# Patient Record
Sex: FEMALE | Race: WHITE | URBAN - METROPOLITAN AREA
[De-identification: names, ages, dates, MRNs, and addresses within clinical notes are randomized per-mention and may not be internally consistent; named-entity substitution may affect disease eponyms.]

---

## 2017-03-21 ENCOUNTER — TELEPHONE (OUTPATIENT)
Dept: FAMILY MEDICINE CLINIC | Facility: CLINIC | Age: 82
End: 2017-03-21

## 2017-03-21 DIAGNOSIS — R92.8 ABNORMAL MAMMOGRAM OF RIGHT BREAST: Primary | ICD-10-CM

## 2017-03-21 RX ORDER — MINOXIDIL 2 %
1 SOLUTION, NON-ORAL TOPICAL DAILY
COMMUNITY
Start: 2014-11-07

## 2017-03-21 RX ORDER — FLUOCINONIDE 0.5 MG/G
1 OINTMENT TOPICAL 2 TIMES DAILY PRN
COMMUNITY
Start: 2016-04-11 | End: 2020-05-29 | Stop reason: ALTCHOICE

## 2017-03-21 RX ORDER — MULTIVITAMIN
1 CAPSULE ORAL DAILY
COMMUNITY
Start: 2014-11-07

## 2017-03-29 ENCOUNTER — TELEPHONE (OUTPATIENT)
Dept: FAMILY MEDICINE CLINIC | Facility: CLINIC | Age: 82
End: 2017-03-29

## 2017-03-29 ENCOUNTER — OFFICE VISIT (OUTPATIENT)
Dept: FAMILY MEDICINE CLINIC | Facility: CLINIC | Age: 82
End: 2017-03-29

## 2017-03-29 VITALS
WEIGHT: 172.13 LBS | HEIGHT: 63 IN | SYSTOLIC BLOOD PRESSURE: 184 MMHG | HEART RATE: 80 BPM | RESPIRATION RATE: 16 BRPM | BODY MASS INDEX: 30.5 KG/M2 | TEMPERATURE: 98 F | DIASTOLIC BLOOD PRESSURE: 98 MMHG

## 2017-03-29 DIAGNOSIS — K57.30 DIVERTICULOSIS OF LARGE INTESTINE WITHOUT HEMORRHAGE: ICD-10-CM

## 2017-03-29 DIAGNOSIS — M35.3 POLYMYALGIA RHEUMATICA (HCC): ICD-10-CM

## 2017-03-29 DIAGNOSIS — K57.32 DIVERTICULITIS OF COLON: Primary | ICD-10-CM

## 2017-03-29 DIAGNOSIS — E78.5 HYPERLIPIDEMIA, UNSPECIFIED HYPERLIPIDEMIA TYPE: ICD-10-CM

## 2017-03-29 PROBLEM — Z98.890 H/O COLONOSCOPY: Status: ACTIVE | Noted: 2017-03-29

## 2017-03-29 PROCEDURE — 99213 OFFICE O/P EST LOW 20 MIN: CPT | Performed by: FAMILY MEDICINE

## 2017-03-29 RX ORDER — AMOXICILLIN AND CLAVULANATE POTASSIUM 875; 125 MG/1; MG/1
1 TABLET, FILM COATED ORAL 2 TIMES DAILY
Qty: 20 TABLET | Refills: 0 | Status: SHIPPED | OUTPATIENT
Start: 2017-03-29 | End: 2017-04-08

## 2017-03-29 RX ORDER — MELATONIN
1000 DAILY
COMMUNITY
End: 2018-10-02

## 2017-03-29 RX ORDER — MULTIVIT WITH MINERALS/LUTEIN
1000 TABLET ORAL DAILY
COMMUNITY
End: 2018-05-25

## 2017-03-29 NOTE — PROGRESS NOTES
[facility] [place]  PROGRESS NOTE  Chief Complaint:   Patient presents with:  Abdominal Pain: LLQ      HPI:   This is a 80year old female coming in for left lower quadrant abdominal pain.   She has had mild left lower quadrant pain for over a we Tab Take 1 tablet by mouth daily. Disp:  Rfl:    Omega-3 Fatty Acids ([employer] FISH OIL) 1200 MG Oral Cap Take 1 capsule by mouth daily. Disp:  Rfl:    hydrocortisone (ANUSOL-HC) 2.5 % Rectal Cream Apply 1 Application topically 2 (two) times daily as needed.  Lyla Gibbs today.  She does not look to be in any acute distress.   HEENT:  Head:  Normocephalic, atraumatic Eyes: EOMI, PERRLA, no scleral icterus, conjunctivae clear bilaterally, no eye discharge Ears: External normal. Nose: patent, no nasal discharge Throat:  No to Patient is notified to call with any questions, complications, allergies, or worsening or changing symptoms. Patient is to call with any side effects or complications from the treatments as a result of today.      Problem List:  Patient Active Problem List

## 2017-03-29 NOTE — PATIENT INSTRUCTIONS
Take Augmentin twice a day for 10 days. If not better in 10 days, come back to the office for additional evaluation.

## 2017-03-29 NOTE — TELEPHONE ENCOUNTER
Patient states She has had LLQ pain x1 week. Feels it maybe from Diverticulitis. Appt given Today 4:30 with Dr Kyle Gonzalez for evaluation of Sx.   Gretel Jalen, 03/29/2017, 11:10 AM

## 2017-04-04 ENCOUNTER — TELEPHONE (OUTPATIENT)
Dept: FAMILY MEDICINE CLINIC | Facility: CLINIC | Age: 82
End: 2017-04-04

## 2017-04-04 ENCOUNTER — MED REC SCAN ONLY (OUTPATIENT)
Dept: FAMILY MEDICINE CLINIC | Facility: CLINIC | Age: 82
End: 2017-04-04

## 2017-04-04 NOTE — TELEPHONE ENCOUNTER
Patient states She was notified by [facility] to repeat Mammogram in 6 Months.   Tonie Cueva, 04/04/2017, 2:37 PM

## 2017-05-19 ENCOUNTER — LAB ENCOUNTER (OUTPATIENT)
Dept: LAB | Age: 82
End: 2017-05-19
Attending: FAMILY MEDICINE
Payer: MEDICARE

## 2017-05-19 DIAGNOSIS — K57.32 DIVERTICULITIS OF COLON: ICD-10-CM

## 2017-05-19 DIAGNOSIS — E78.5 HYPERLIPIDEMIA, UNSPECIFIED HYPERLIPIDEMIA TYPE: ICD-10-CM

## 2017-05-19 DIAGNOSIS — M35.3 POLYMYALGIA RHEUMATICA (HCC): ICD-10-CM

## 2017-05-19 DIAGNOSIS — K57.30 DIVERTICULOSIS OF LARGE INTESTINE WITHOUT HEMORRHAGE: ICD-10-CM

## 2017-05-19 PROCEDURE — 80061 LIPID PANEL: CPT

## 2017-05-19 PROCEDURE — 86140 C-REACTIVE PROTEIN: CPT

## 2017-05-19 PROCEDURE — 84550 ASSAY OF BLOOD/URIC ACID: CPT

## 2017-05-19 PROCEDURE — 84443 ASSAY THYROID STIM HORMONE: CPT

## 2017-05-19 PROCEDURE — 85025 COMPLETE CBC W/AUTO DIFF WBC: CPT

## 2017-05-19 PROCEDURE — 36415 COLL VENOUS BLD VENIPUNCTURE: CPT

## 2017-05-19 PROCEDURE — 80053 COMPREHEN METABOLIC PANEL: CPT

## 2017-05-23 ENCOUNTER — OFFICE VISIT (OUTPATIENT)
Dept: FAMILY MEDICINE CLINIC | Facility: CLINIC | Age: 82
End: 2017-05-23

## 2017-05-23 VITALS
RESPIRATION RATE: 20 BRPM | DIASTOLIC BLOOD PRESSURE: 88 MMHG | HEIGHT: 63.4 IN | WEIGHT: 170 LBS | BODY MASS INDEX: 29.75 KG/M2 | TEMPERATURE: 98 F | SYSTOLIC BLOOD PRESSURE: 152 MMHG | HEART RATE: 80 BPM

## 2017-05-23 DIAGNOSIS — I10 HTN (HYPERTENSION), BENIGN: ICD-10-CM

## 2017-05-23 DIAGNOSIS — R92.8 ABNORMAL MAMMOGRAM: ICD-10-CM

## 2017-05-23 DIAGNOSIS — Z00.00 ENCOUNTER FOR ANNUAL HEALTH EXAMINATION: Primary | ICD-10-CM

## 2017-05-23 DIAGNOSIS — N63.0 LUMP OR MASS IN BREAST: ICD-10-CM

## 2017-05-23 DIAGNOSIS — M81.0 AGE-RELATED OSTEOPOROSIS WITHOUT CURRENT PATHOLOGICAL FRACTURE: ICD-10-CM

## 2017-05-23 DIAGNOSIS — B07.8 OTHER VIRAL WARTS: ICD-10-CM

## 2017-05-23 DIAGNOSIS — M35.3 POLYMYALGIA RHEUMATICA (HCC): ICD-10-CM

## 2017-05-23 PROBLEM — B07.9 WARTS: Status: ACTIVE | Noted: 2017-05-23

## 2017-05-23 PROCEDURE — 99214 OFFICE O/P EST MOD 30 MIN: CPT | Performed by: FAMILY MEDICINE

## 2017-05-23 PROCEDURE — G0439 PPPS, SUBSEQ VISIT: HCPCS | Performed by: FAMILY MEDICINE

## 2017-05-23 RX ORDER — LOSARTAN POTASSIUM AND HYDROCHLOROTHIAZIDE 12.5; 5 MG/1; MG/1
1 TABLET ORAL DAILY
Qty: 30 TABLET | Refills: 5 | Status: SHIPPED | OUTPATIENT
Start: 2017-05-23 | End: 2017-06-23

## 2017-05-23 NOTE — PROGRESS NOTES
[facility] [place]  PROGRESS NOTE  Chief Complaint:   Patient presents with:  Physical      HPI:   This is a 80year old female coming in for her annual Medicare physical.  She said that she has been feeling very good overall.     She has noticed .0 150.0-450.0 10(3)uL   MCV 93.7 81.0-100.0 fL   MCH 31.8 27.0-33.2 pg   MCHC 34.0 31.0-37.0 g/dL   RDW 12.7 11.5-16.0 %   RDW-SD 43.8 35.1-46.3 fL   Neutrophil Absolute Prelim 2.58 1.30-6.70 x10 (3) uL   Neutrophil Absolute 2.58 1.30-6.70 x10(3) Disp:  Rfl:    Multiple Vitamin (MULTIVITAMINS) Oral Cap Take 1 capsule by mouth daily. Disp:  Rfl:    Fluocinonide 0.05 % External Ointment Apply 1 Application topically 2 (two) times daily as needed.  Disp:  Rfl:    hydrocortisone (ANUSOL-HC) 2.5 % Rectal Patient is alert, awake and oriented, well developed, well nourished, no apparent distress.   HEENT:  Head:  Normocephalic, atraumatic Eyes: EOMI, PERRLA, no scleral icterus, conjunctivae clear bilaterally, no eye discharge Ears: External normal. Nose: lo diagnosis of hypertension. She has escaped taking a regular daily medicine until age 80 but she now needs blood pressure medication. Plan: Losartan/hydrochlorothiazide 50/12.5 one tablet daily. Recheck in 4 weeks.     5. Polymyalgia rheumatica ([facility] [address]  She

## 2017-05-23 NOTE — PATIENT INSTRUCTIONS
Start taking Losartan daily for blood pressure. Use Compound W on warts on hand. Recommended Websites for Advanced Directives    [email or web address] org/publications/Documents/personal_[email or web address]. pdf  An information packet, including necessary form from the [facility]

## 2017-06-23 ENCOUNTER — OFFICE VISIT (OUTPATIENT)
Dept: FAMILY MEDICINE CLINIC | Facility: CLINIC | Age: 82
End: 2017-06-23

## 2017-06-23 VITALS
TEMPERATURE: 98 F | WEIGHT: 169.13 LBS | HEIGHT: 62 IN | HEART RATE: 104 BPM | RESPIRATION RATE: 16 BRPM | BODY MASS INDEX: 31.12 KG/M2 | DIASTOLIC BLOOD PRESSURE: 58 MMHG | SYSTOLIC BLOOD PRESSURE: 124 MMHG

## 2017-06-23 DIAGNOSIS — I10 HTN (HYPERTENSION), BENIGN: Primary | ICD-10-CM

## 2017-06-23 PROCEDURE — 99213 OFFICE O/P EST LOW 20 MIN: CPT | Performed by: FAMILY MEDICINE

## 2017-06-23 RX ORDER — LOSARTAN POTASSIUM AND HYDROCHLOROTHIAZIDE 12.5; 5 MG/1; MG/1
1 TABLET ORAL DAILY
Qty: 90 TABLET | Refills: 3 | Status: SHIPPED | OUTPATIENT
Start: 2017-06-23 | End: 2017-09-22

## 2017-06-23 NOTE — PROGRESS NOTES
[address]  PROGRESS NOTE  Chief Complaint:   Patient presents with: Follow - Up:  with Recent CVA      HPI:   This is a 80year old female coming in for aloe up on her blood pressure.   She is not having any problems whatsoever w 35.1-46.3 fL   Neutrophil Absolute Prelim 2.58 1.30-6.70 x10 (3) uL   Neutrophil Absolute 2.58 1.30-6.70 x10(3) uL   Lymphocyte Absolute 1.87 0.90-4.00 x10(3) uL   Monocyte Absolute 0.63 (H) 0.10-0.60 x10(3) uL   Eosinophil Absolute 0.08 0.00-0.30 x10(3) u hydrocortisone (ANUSOL-HC) 2.5 % Rectal Cream Apply 1 Application topically 2 (two) times daily as needed. Disp:  Rfl:    Multiple Vitamin (MULTIVITAMINS) Oral Cap Take 1 capsule by mouth daily.  Disp:  Rfl:    [DISCONTINUED] Losartan Potassium-HCTZ 50-12 normal. Nose: patent, no nasal discharge Throat:  No tonsillar erythema or exudate. Mouth:  No oral lesions or ulcerations, good dentition. NECK: Supple, no CLAD, no JVD, no thyromegaly. SKIN: No rashes, no skin lesion, no bruising, good turgor.   HEART:

## 2017-09-18 ENCOUNTER — TELEPHONE (OUTPATIENT)
Dept: FAMILY MEDICINE CLINIC | Facility: CLINIC | Age: 82
End: 2017-09-18

## 2017-09-18 NOTE — TELEPHONE ENCOUNTER
Confirmed with Patient She was informed by [facility] to repeat Mammogram 6 Months-Yes, She was informed.   Nathan Gan, 09/18/17, 4:29 PM

## 2017-09-22 ENCOUNTER — OFFICE VISIT (OUTPATIENT)
Dept: FAMILY MEDICINE CLINIC | Facility: CLINIC | Age: 82
End: 2017-09-22

## 2017-09-22 VITALS
HEART RATE: 88 BPM | SYSTOLIC BLOOD PRESSURE: 130 MMHG | HEIGHT: 62 IN | DIASTOLIC BLOOD PRESSURE: 56 MMHG | RESPIRATION RATE: 16 BRPM | WEIGHT: 168 LBS | TEMPERATURE: 98 F | BODY MASS INDEX: 30.91 KG/M2

## 2017-09-22 DIAGNOSIS — I10 HTN (HYPERTENSION), BENIGN: Primary | ICD-10-CM

## 2017-09-22 PROCEDURE — 99213 OFFICE O/P EST LOW 20 MIN: CPT | Performed by: FAMILY MEDICINE

## 2017-09-22 RX ORDER — LOSARTAN POTASSIUM AND HYDROCHLOROTHIAZIDE 12.5; 5 MG/1; MG/1
1 TABLET ORAL DAILY
Qty: 90 TABLET | Refills: 3 | Status: SHIPPED | OUTPATIENT
Start: 2017-09-22 | End: 2018-09-07

## 2017-09-22 NOTE — PROGRESS NOTES
[address]  PROGRESS NOTE  Chief Complaint:   Patient presents with: Follow - Up      HPI:   This is a 80year old female coming in for follow-up on her blood pressure.   Since she was here last her  has passed away from his hemor Lymphocyte Absolute 1.87 0.90 - 4.00 x10(3) uL   Monocyte Absolute 0.63 (H) 0.10 - 0.60 x10(3) uL   Eosinophil Absolute 0.08 0.00 - 0.30 x10(3) uL   Basophil Absolute 0.05 0.00 - 0.10 x10(3) uL   Immature Granulocyte Absolute 0.02 0.00 - 1.00 x10(3) uL Apply 1 Application topically 2 (two) times daily as needed. Disp:  Rfl:    Multiple Vitamin (MULTIVITAMINS) Oral Cap Take 1 capsule by mouth daily.  Disp:  Rfl:       Counseling given: Not Answered       REVIEW OF SYSTEMS:   CONSTITUTIONAL:  Denies unusual awake and oriented, well developed, well nourished, no apparent distress.   HEENT:  Head:  Normocephalic, atraumatic Eyes: EOMI, PERRLA, no scleral icterus, conjunctivae clear bilaterally, no eye discharge Ears: External normal. Nose: patent, no nasal disch

## 2018-02-21 ENCOUNTER — MED REC SCAN ONLY (OUTPATIENT)
Dept: FAMILY MEDICINE CLINIC | Facility: CLINIC | Age: 83
End: 2018-02-21

## 2018-03-28 ENCOUNTER — MED REC SCAN ONLY (OUTPATIENT)
Dept: FAMILY MEDICINE CLINIC | Facility: CLINIC | Age: 83
End: 2018-03-28

## 2018-05-17 ENCOUNTER — TELEPHONE (OUTPATIENT)
Dept: FAMILY MEDICINE CLINIC | Facility: CLINIC | Age: 83
End: 2018-05-17

## 2018-05-17 DIAGNOSIS — M35.3 POLYMYALGIA RHEUMATICA (HCC): ICD-10-CM

## 2018-05-17 DIAGNOSIS — Z13.220 ENCOUNTER FOR LIPID SCREENING FOR CARDIOVASCULAR DISEASE: Primary | ICD-10-CM

## 2018-05-17 DIAGNOSIS — Z90.49 HISTORY OF PARTIAL COLECTOMY: ICD-10-CM

## 2018-05-17 DIAGNOSIS — K57.30 DIVERTICULOSIS OF COLON: ICD-10-CM

## 2018-05-17 DIAGNOSIS — Z13.6 ENCOUNTER FOR LIPID SCREENING FOR CARDIOVASCULAR DISEASE: Primary | ICD-10-CM

## 2018-05-17 DIAGNOSIS — I10 HTN (HYPERTENSION), BENIGN: ICD-10-CM

## 2018-05-17 DIAGNOSIS — M81.0 AGE-RELATED OSTEOPOROSIS WITHOUT CURRENT PATHOLOGICAL FRACTURE: ICD-10-CM

## 2018-05-17 NOTE — TELEPHONE ENCOUNTER
Pt needs orders for fasting labs regarding upcoming appointment on Monday 5/21.  Pt scheduled to see Dr Edgar Campbell for annual px on Friday 5/25

## 2018-05-21 ENCOUNTER — LABORATORY ENCOUNTER (OUTPATIENT)
Dept: LAB | Age: 83
End: 2018-05-21
Attending: FAMILY MEDICINE
Payer: MEDICARE

## 2018-05-21 DIAGNOSIS — M81.0 AGE-RELATED OSTEOPOROSIS WITHOUT CURRENT PATHOLOGICAL FRACTURE: ICD-10-CM

## 2018-05-21 DIAGNOSIS — M35.3 POLYMYALGIA RHEUMATICA (HCC): ICD-10-CM

## 2018-05-21 DIAGNOSIS — Z13.6 ENCOUNTER FOR LIPID SCREENING FOR CARDIOVASCULAR DISEASE: ICD-10-CM

## 2018-05-21 DIAGNOSIS — Z90.49 HISTORY OF PARTIAL COLECTOMY: ICD-10-CM

## 2018-05-21 DIAGNOSIS — Z13.220 ENCOUNTER FOR LIPID SCREENING FOR CARDIOVASCULAR DISEASE: ICD-10-CM

## 2018-05-21 DIAGNOSIS — I10 HTN (HYPERTENSION), BENIGN: ICD-10-CM

## 2018-05-21 DIAGNOSIS — K57.30 DIVERTICULOSIS OF COLON: ICD-10-CM

## 2018-05-21 PROCEDURE — 84550 ASSAY OF BLOOD/URIC ACID: CPT

## 2018-05-21 PROCEDURE — 85025 COMPLETE CBC W/AUTO DIFF WBC: CPT

## 2018-05-21 PROCEDURE — 80053 COMPREHEN METABOLIC PANEL: CPT

## 2018-05-21 PROCEDURE — 84443 ASSAY THYROID STIM HORMONE: CPT

## 2018-05-21 PROCEDURE — 36415 COLL VENOUS BLD VENIPUNCTURE: CPT

## 2018-05-21 PROCEDURE — 80061 LIPID PANEL: CPT

## 2018-05-25 ENCOUNTER — OFFICE VISIT (OUTPATIENT)
Dept: FAMILY MEDICINE CLINIC | Facility: CLINIC | Age: 83
End: 2018-05-25

## 2018-05-25 VITALS
RESPIRATION RATE: 14 BRPM | HEART RATE: 68 BPM | BODY MASS INDEX: 29.63 KG/M2 | SYSTOLIC BLOOD PRESSURE: 144 MMHG | HEIGHT: 62.5 IN | DIASTOLIC BLOOD PRESSURE: 60 MMHG | WEIGHT: 165.13 LBS | TEMPERATURE: 97 F

## 2018-05-25 DIAGNOSIS — K57.30 DIVERTICULOSIS OF COLON: ICD-10-CM

## 2018-05-25 DIAGNOSIS — M81.0 AGE-RELATED OSTEOPOROSIS WITHOUT CURRENT PATHOLOGICAL FRACTURE: ICD-10-CM

## 2018-05-25 DIAGNOSIS — Z00.00 ENCOUNTER FOR ANNUAL HEALTH EXAMINATION: ICD-10-CM

## 2018-05-25 DIAGNOSIS — I10 HTN (HYPERTENSION), BENIGN: Primary | ICD-10-CM

## 2018-05-25 DIAGNOSIS — M35.3 POLYMYALGIA RHEUMATICA (HCC): ICD-10-CM

## 2018-05-25 DIAGNOSIS — B07.8 OTHER VIRAL WARTS: ICD-10-CM

## 2018-05-25 DIAGNOSIS — Z23 NEED FOR VACCINATION: ICD-10-CM

## 2018-05-25 PROCEDURE — 90732 PPSV23 VACC 2 YRS+ SUBQ/IM: CPT | Performed by: FAMILY MEDICINE

## 2018-05-25 PROCEDURE — G0439 PPPS, SUBSEQ VISIT: HCPCS | Performed by: FAMILY MEDICINE

## 2018-05-25 PROCEDURE — G0009 ADMIN PNEUMOCOCCAL VACCINE: HCPCS | Performed by: FAMILY MEDICINE

## 2018-05-25 PROCEDURE — 99214 OFFICE O/P EST MOD 30 MIN: CPT | Performed by: FAMILY MEDICINE

## 2018-05-25 NOTE — PROGRESS NOTES
[facility] [place]  PROGRESS NOTE  Chief Complaint:   Patient presents with:  Physical      HPI:   This is a 80year old female coming in for her annual Medicare wellness exam.    She said that she has been feeling very good.   She is not having 33.2 pg   MCHC 35.0 31.0 - 37.0 g/dL   RDW 12.4 11.5 - 16.0 %   RDW-SD 43.3 35.1 - 46.3 fL   Neutrophil Absolute Prelim 2.89 1.30 - 6.70 x10 (3) uL   Neutrophil Absolute 2.89 1.30 - 6.70 x10(3) uL   Lymphocyte Absolute 1.77 0.90 - 4.00 x10(3) uL   Monocyte Rfl:    Omega-3 Fatty Acids ([employer] FISH OIL) 1200 MG Oral Cap Take 1 capsule by mouth daily. Disp:  Rfl:    hydrocortisone (ANUSOL-HC) 2.5 % Rectal Cream Apply 1 Application topically 2 (two) times daily as needed.  Disp:  Rfl:    Multiple Vitamin (Goldie Metro this encounter: 62.5\". Weight as of this encounter: 165 lb 2 oz. Vital signs reviewed. Physical Exam:  GEN:  Patient is alert, awake and oriented, well developed, well nourished, no apparent distress.   HEENT:  Head:  Normocephalic, atraumatic Eyes: OFFICE/OUTPT VISIT,EST,LEVL IV    4. Other viral warts  She has a history of getting warts on her hands. She had treated them in the past with Compound W successfully. Plan: Restart the Compound W on her warts.  - OFFICE/OUTPT VISIT,EST,LEVL IV    5.  Tres

## 2018-05-25 NOTE — PATIENT INSTRUCTIONS
Recommended Websites for Advanced Directives    [email or web address] org/publications/Documents/personal_[email or web address]. pdf  An information packet, including necessary form from the [employer] website. http://[email or web address] [place]. [place]/public/books/adv

## 2018-08-24 ENCOUNTER — MED REC SCAN ONLY (OUTPATIENT)
Dept: FAMILY MEDICINE CLINIC | Facility: CLINIC | Age: 83
End: 2018-08-24

## 2018-09-07 NOTE — TELEPHONE ENCOUNTER
Future appt:    Last Appointment:  5/25/2018    Cholesterol, Total (mg/dL)   Date Value   05/21/2018 189   ----------  HDL Cholesterol (mg/dL)   Date Value   05/21/2018 69   ----------  LDL Cholesterol (mg/dL)   Date Value   05/21/2018 95   ----------  Tri

## 2018-09-08 RX ORDER — LOSARTAN POTASSIUM AND HYDROCHLOROTHIAZIDE 12.5; 5 MG/1; MG/1
1 TABLET ORAL DAILY
Qty: 90 TABLET | Refills: 0 | Status: SHIPPED | OUTPATIENT
Start: 2018-09-08 | End: 2018-12-05

## 2018-10-02 ENCOUNTER — OFFICE VISIT (OUTPATIENT)
Dept: FAMILY MEDICINE CLINIC | Facility: CLINIC | Age: 83
End: 2018-10-02
Payer: MEDICARE

## 2018-10-02 VITALS
RESPIRATION RATE: 20 BRPM | TEMPERATURE: 98 F | BODY MASS INDEX: 30.29 KG/M2 | SYSTOLIC BLOOD PRESSURE: 130 MMHG | WEIGHT: 164.63 LBS | DIASTOLIC BLOOD PRESSURE: 64 MMHG | HEIGHT: 62 IN | HEART RATE: 100 BPM

## 2018-10-02 DIAGNOSIS — H25.13 AGE-RELATED NUCLEAR CATARACT OF BOTH EYES: ICD-10-CM

## 2018-10-02 DIAGNOSIS — Z01.818 PREOP EXAMINATION: Primary | ICD-10-CM

## 2018-10-02 DIAGNOSIS — I10 HTN (HYPERTENSION), BENIGN: ICD-10-CM

## 2018-10-02 PROCEDURE — 93000 ELECTROCARDIOGRAM COMPLETE: CPT | Performed by: FAMILY MEDICINE

## 2018-10-02 PROCEDURE — 99214 OFFICE O/P EST MOD 30 MIN: CPT | Performed by: FAMILY MEDICINE

## 2018-10-02 NOTE — PROGRESS NOTES
[facility] [place]  PROGRESS NOTE  Chief Complaint:   Patient presents with:  Pre-Op Exam      HPI:   This is a 80year old female coming in for preoperative clearance for cataract surgery.   She is scheduled for bilateral cataract surgery on Oct g/dL    RDW 12.4 11.5 - 16.0 %    RDW-SD 43.3 35.1 - 46.3 fL    Neutrophil Absolute Prelim 2.89 1.30 - 6.70 x10 (3) uL    Neutrophil Absolute 2.89 1.30 - 6.70 x10(3) uL    Lymphocyte Absolute 1.77 0.90 - 4.00 x10(3) uL    Monocyte Absolute 0.63 0.10 - 1.00 mouth daily. Disp:  Rfl:    hydrocortisone (ANUSOL-HC) 2.5 % Rectal Cream Apply 1 Application topically 2 (two) times daily as needed. Disp:  Rfl:    Multiple Vitamin (MULTIVITAMINS) Oral Cap Take 1 capsule by mouth daily.  Disp:  Rfl:       Counseling give stated age, well groomed. Physical Exam:  GEN:  Patient is alert, awake and oriented, well developed, well nourished, no apparent distress.   HEENT:  Head:  Normocephalic, atraumatic Eyes: EOMI, PERRLA, no scleral icterus, conjunctivae clear bilaterally, n allergies, or worsening or changing symptoms. Patient is to call with any side effects or complications from the treatments as a result of today.      Problem List:  Patient Active Problem List:     Abnormal mammogram     Lump or mass in breast     History

## 2018-10-16 ENCOUNTER — MED REC SCAN ONLY (OUTPATIENT)
Dept: FAMILY MEDICINE CLINIC | Facility: CLINIC | Age: 83
End: 2018-10-16

## 2018-11-07 ENCOUNTER — MED REC SCAN ONLY (OUTPATIENT)
Dept: FAMILY MEDICINE CLINIC | Facility: CLINIC | Age: 83
End: 2018-11-07

## 2018-12-05 RX ORDER — LOSARTAN POTASSIUM AND HYDROCHLOROTHIAZIDE 12.5; 5 MG/1; MG/1
1 TABLET ORAL DAILY
Qty: 90 TABLET | Refills: 3 | Status: SHIPPED | OUTPATIENT
Start: 2018-12-05 | End: 2019-05-28

## 2018-12-05 NOTE — TELEPHONE ENCOUNTER
Future appt:    Last Appointment:  10/2/2018  Cholesterol, Total (mg/dL)   Date Value   05/21/2018 189     HDL Cholesterol (mg/dL)   Date Value   05/21/2018 69     LDL Cholesterol (mg/dL)   Date Value   05/21/2018 95     Triglycerides (mg/dL)   Date Value

## 2018-12-21 ENCOUNTER — OFFICE VISIT (OUTPATIENT)
Dept: FAMILY MEDICINE CLINIC | Facility: CLINIC | Age: 83
End: 2018-12-21
Payer: MEDICARE

## 2018-12-21 VITALS
RESPIRATION RATE: 18 BRPM | TEMPERATURE: 97 F | BODY MASS INDEX: 30.33 KG/M2 | HEIGHT: 62 IN | SYSTOLIC BLOOD PRESSURE: 144 MMHG | WEIGHT: 164.81 LBS | HEART RATE: 94 BPM | DIASTOLIC BLOOD PRESSURE: 60 MMHG

## 2018-12-21 DIAGNOSIS — Z01.818 PREOP EXAMINATION: Primary | ICD-10-CM

## 2018-12-21 DIAGNOSIS — I10 HTN (HYPERTENSION), BENIGN: ICD-10-CM

## 2018-12-21 DIAGNOSIS — H25.13 AGE-RELATED NUCLEAR CATARACT OF BOTH EYES: ICD-10-CM

## 2018-12-21 PROCEDURE — 99214 OFFICE O/P EST MOD 30 MIN: CPT | Performed by: FAMILY MEDICINE

## 2018-12-21 PROCEDURE — 93000 ELECTROCARDIOGRAM COMPLETE: CPT | Performed by: FAMILY MEDICINE

## 2018-12-21 NOTE — PROGRESS NOTES
[facility]  [facility]ESS [facility]  Chief Complaint:   Patient presents with:  Pre-Op Exam: 1/9 eye surgery at [facility]      HPI:   This is a 80year old female coming in for preoperative evaluation for eye surgery at [facility] with Dr. Juan Jose Schluz g/dL    RDW 12.4 11.5 - 16.0 %    RDW-SD 43.3 35.1 - 46.3 fL    Neutrophil Absolute Prelim 2.89 1.30 - 6.70 x10 (3) uL    Neutrophil Absolute 2.89 1.30 - 6.70 x10(3) uL    Lymphocyte Absolute 1.77 0.90 - 4.00 x10(3) uL    Monocyte Absolute 0.63 0.10 - 1.00 Rectal Cream Apply 1 Application topically 2 (two) times daily as needed. Disp:  Rfl:    Multiple Vitamin (MULTIVITAMINS) Oral Cap Take 1 capsule by mouth daily.  Disp:  Rfl:       Counseling given: Not Answered       REVIEW OF SYSTEMS:   CONSTITUTIONAL:  D Eyes: EOMI, PERRLA, no scleral icterus, conjunctivae clear bilaterally, no eye discharge Ears: External normal. Nose: patent, no nasal discharge Throat:  No tonsillar erythema or exudate. Mouth:  No oral lesions or ulcerations, good dentition.   NECK: Supp Diverticulitis of colon     Diverticulosis of colon     Eczema     H/O oophorectomy     Osteoporosis     Polymyalgia rheumatica ([facility] [address]     History of tonsillectomy     Postmenopausal atrophic vaginitis     Warts     HTN (hypertension), benign     Age-relate

## 2018-12-26 ENCOUNTER — TELEPHONE (OUTPATIENT)
Dept: FAMILY MEDICINE CLINIC | Facility: CLINIC | Age: 83
End: 2018-12-26

## 2018-12-26 DIAGNOSIS — I10 HTN (HYPERTENSION), BENIGN: ICD-10-CM

## 2018-12-26 DIAGNOSIS — H25.13 AGE-RELATED NUCLEAR CATARACT OF BOTH EYES: ICD-10-CM

## 2018-12-26 DIAGNOSIS — Z01.818 PREOP EXAMINATION: Primary | ICD-10-CM

## 2018-12-26 NOTE — TELEPHONE ENCOUNTER
----- Message from Vinicius Vazquez sent at 12/26/2018  8:31 AM CST -----  Regarding: lab orders needed   Patient has lab appointment on 12/28/18 could you please put lab orders in system.         ThanksRadha

## 2018-12-28 ENCOUNTER — LABORATORY ENCOUNTER (OUTPATIENT)
Dept: LAB | Age: 83
End: 2018-12-28
Attending: FAMILY MEDICINE
Payer: MEDICARE

## 2018-12-28 DIAGNOSIS — Z01.818 PREOP EXAMINATION: ICD-10-CM

## 2018-12-28 DIAGNOSIS — H25.13 AGE-RELATED NUCLEAR CATARACT OF BOTH EYES: ICD-10-CM

## 2018-12-28 DIAGNOSIS — I10 HTN (HYPERTENSION), BENIGN: ICD-10-CM

## 2018-12-28 PROCEDURE — 36415 COLL VENOUS BLD VENIPUNCTURE: CPT

## 2018-12-28 PROCEDURE — 85025 COMPLETE CBC W/AUTO DIFF WBC: CPT

## 2018-12-28 PROCEDURE — 80053 COMPREHEN METABOLIC PANEL: CPT

## 2018-12-29 ENCOUNTER — TELEPHONE (OUTPATIENT)
Dept: FAMILY MEDICINE CLINIC | Facility: CLINIC | Age: 83
End: 2018-12-29

## 2018-12-29 NOTE — TELEPHONE ENCOUNTER
----- Message from Rimma Rivas MD sent at 12/28/2018  5:12 PM CST -----  Please call June. Her laboratory tests were all normal.  Her hemoglobin is normal at 13.4.   Her chemistry profile is normal.  Her kidney function is normal.  She is medically c

## 2019-01-02 ENCOUNTER — TELEPHONE (OUTPATIENT)
Dept: FAMILY MEDICINE CLINIC | Facility: CLINIC | Age: 84
End: 2019-01-02

## 2019-03-15 ENCOUNTER — OFFICE VISIT (OUTPATIENT)
Dept: FAMILY MEDICINE CLINIC | Facility: CLINIC | Age: 84
End: 2019-03-15
Payer: MEDICARE

## 2019-03-15 ENCOUNTER — TELEPHONE (OUTPATIENT)
Dept: FAMILY MEDICINE CLINIC | Facility: CLINIC | Age: 84
End: 2019-03-15

## 2019-03-15 VITALS
SYSTOLIC BLOOD PRESSURE: 112 MMHG | DIASTOLIC BLOOD PRESSURE: 74 MMHG | HEART RATE: 104 BPM | BODY MASS INDEX: 30.27 KG/M2 | WEIGHT: 164.5 LBS | RESPIRATION RATE: 16 BRPM | HEIGHT: 62 IN | TEMPERATURE: 98 F

## 2019-03-15 DIAGNOSIS — A05.0: Primary | ICD-10-CM

## 2019-03-15 DIAGNOSIS — K57.30 DIVERTICULOSIS OF COLON: ICD-10-CM

## 2019-03-15 DIAGNOSIS — M81.0 AGE-RELATED OSTEOPOROSIS WITHOUT CURRENT PATHOLOGICAL FRACTURE: ICD-10-CM

## 2019-03-15 DIAGNOSIS — M35.3 POLYMYALGIA RHEUMATICA (HCC): ICD-10-CM

## 2019-03-15 DIAGNOSIS — I10 HTN (HYPERTENSION), BENIGN: Primary | ICD-10-CM

## 2019-03-15 PROCEDURE — 99214 OFFICE O/P EST MOD 30 MIN: CPT | Performed by: FAMILY MEDICINE

## 2019-03-15 NOTE — PROGRESS NOTES
[facility] [place]  PROGRESS NOTE  Chief Complaint:   Patient presents with:  Abdominal Pain: Going on since last night  Vomiting      HPI:   This is a 80year old female coming in for abdominal pain and vomiting.   She said that she ate pork hanna (3) uL    Neutrophil Absolute 3.43 1.30 - 6.70 x10(3) uL    Lymphocyte Absolute 2.24 0.90 - 4.00 x10(3) uL    Monocyte Absolute 0.75 0.10 - 1.00 x10(3) uL    Eosinophil Absolute 0.10 0.00 - 0.30 x10(3) uL    Basophil Absolute 0.06 0.00 - 0.10 x10(3) uL 1 capsule by mouth daily. Disp:  Rfl:       Counseling given: Not Answered       REVIEW OF SYSTEMS:   CONSTITUTIONAL: She said that she did not have a fever during the night.   EENT:  Eyes:  Denies eye pain, visual loss, blurred vision, double vision or yel Throat:  No tonsillar erythema or exudate. Mouth:  No oral lesions or ulcerations, good dentition. NECK: Supple, no CLAD, no JVD, no thyromegaly. SKIN: No rashes, no skin lesion, no bruising, good turgor.   HEART:  Regular rate and rhythm, no murmurs, ru

## 2019-03-15 NOTE — TELEPHONE ENCOUNTER
Patient states She developed Abdominal Pain after dinner last night. Feels it maybe diverticulitis flare up. Vomited this morning. Appt given this morning with Dr Heidi Flannery for evaluation of Sx.   Parul Leblanc, 03/15/19, 9:27 AM

## 2019-05-21 ENCOUNTER — LABORATORY ENCOUNTER (OUTPATIENT)
Dept: LAB | Age: 84
End: 2019-05-21
Attending: FAMILY MEDICINE
Payer: MEDICARE

## 2019-05-21 DIAGNOSIS — M81.0 AGE-RELATED OSTEOPOROSIS WITHOUT CURRENT PATHOLOGICAL FRACTURE: ICD-10-CM

## 2019-05-21 DIAGNOSIS — M35.3 POLYMYALGIA RHEUMATICA (HCC): ICD-10-CM

## 2019-05-21 DIAGNOSIS — I10 HTN (HYPERTENSION), BENIGN: ICD-10-CM

## 2019-05-21 DIAGNOSIS — K57.30 DIVERTICULOSIS OF COLON: ICD-10-CM

## 2019-05-21 PROCEDURE — 36415 COLL VENOUS BLD VENIPUNCTURE: CPT

## 2019-05-21 PROCEDURE — 80053 COMPREHEN METABOLIC PANEL: CPT

## 2019-05-21 PROCEDURE — 84443 ASSAY THYROID STIM HORMONE: CPT

## 2019-05-21 PROCEDURE — 80061 LIPID PANEL: CPT

## 2019-05-21 PROCEDURE — 85025 COMPLETE CBC W/AUTO DIFF WBC: CPT

## 2019-05-21 PROCEDURE — 84550 ASSAY OF BLOOD/URIC ACID: CPT

## 2019-05-28 ENCOUNTER — OFFICE VISIT (OUTPATIENT)
Dept: FAMILY MEDICINE CLINIC | Facility: CLINIC | Age: 84
End: 2019-05-28
Payer: MEDICARE

## 2019-05-28 ENCOUNTER — TELEPHONE (OUTPATIENT)
Dept: FAMILY MEDICINE CLINIC | Facility: CLINIC | Age: 84
End: 2019-05-28

## 2019-05-28 VITALS
OXYGEN SATURATION: 94 % | HEIGHT: 62 IN | SYSTOLIC BLOOD PRESSURE: 118 MMHG | TEMPERATURE: 97 F | WEIGHT: 166.38 LBS | RESPIRATION RATE: 14 BRPM | HEART RATE: 85 BPM | DIASTOLIC BLOOD PRESSURE: 74 MMHG | BODY MASS INDEX: 30.62 KG/M2

## 2019-05-28 DIAGNOSIS — K57.30 DIVERTICULOSIS OF COLON: ICD-10-CM

## 2019-05-28 DIAGNOSIS — L20.82 FLEXURAL ECZEMA: ICD-10-CM

## 2019-05-28 DIAGNOSIS — M35.3 POLYMYALGIA RHEUMATICA (HCC): ICD-10-CM

## 2019-05-28 DIAGNOSIS — I10 HTN (HYPERTENSION), BENIGN: ICD-10-CM

## 2019-05-28 DIAGNOSIS — Z98.890 H/O COLONOSCOPY: ICD-10-CM

## 2019-05-28 DIAGNOSIS — Z90.722 HISTORY OF BILATERAL OOPHORECTOMY: ICD-10-CM

## 2019-05-28 DIAGNOSIS — N95.2 POSTMENOPAUSAL ATROPHIC VAGINITIS: ICD-10-CM

## 2019-05-28 DIAGNOSIS — Z90.89 HISTORY OF TONSILLECTOMY: ICD-10-CM

## 2019-05-28 DIAGNOSIS — Z00.00 ENCOUNTER FOR ANNUAL HEALTH EXAMINATION: Primary | ICD-10-CM

## 2019-05-28 DIAGNOSIS — M81.0 AGE-RELATED OSTEOPOROSIS WITHOUT CURRENT PATHOLOGICAL FRACTURE: ICD-10-CM

## 2019-05-28 DIAGNOSIS — Z90.49 HISTORY OF PARTIAL COLECTOMY: ICD-10-CM

## 2019-05-28 PROBLEM — A05.0: Status: RESOLVED | Noted: 2019-03-15 | Resolved: 2019-05-28

## 2019-05-28 PROCEDURE — G0439 PPPS, SUBSEQ VISIT: HCPCS | Performed by: FAMILY MEDICINE

## 2019-05-28 PROCEDURE — 99213 OFFICE O/P EST LOW 20 MIN: CPT | Performed by: FAMILY MEDICINE

## 2019-05-28 RX ORDER — LISINOPRIL 10 MG/1
10 TABLET ORAL DAILY
Qty: 90 TABLET | Refills: 1 | Status: SHIPPED | OUTPATIENT
Start: 2019-05-28 | End: 2019-11-10

## 2019-05-28 NOTE — TELEPHONE ENCOUNTER
Pt states that there was a recall of Losartan. She stated that the pharmacy told her that it is also on backorder. I called pharmacy to verify and was told that it does have a recall and it is on  backorder.  They stated that something else

## 2019-05-28 NOTE — TELEPHONE ENCOUNTER
Unfortunately losartan, which has been very effective for June, is on back order and not available at present. Plan: Start lisinopril 10 mg p.o. daily. Recheck in 2 to 3 weeks to check blood pressure and make sure that this is okay.   If there are any pro

## 2019-05-28 NOTE — PATIENT INSTRUCTIONS
Recommended Websites for Advanced Directives    [email or web address].no. org/publications/Documents/personal_dec. pdf  An information packet, including necessary form from the [address] website. http://[email or web address] [place]state. [place]/public/books/adv

## 2019-05-28 NOTE — PROGRESS NOTES
[facility] [place]  PROGRESS NOTE  Chief Complaint:   Patient presents with:  Physical      HPI:   This is a 80year old female coming in for her annual wellness visit. She said that she has been doing very well.   She denies any new problems 80.0 - 100.0 fL    MCH 32.8 26.0 - 34.0 pg    MCHC 33.8 31.0 - 37.0 g/dL    RDW 12.4 11.0 - 15.0 %    RDW-SD 44.4 35.1 - 46.3 fL    Neutrophil Absolute Prelim 2.71 1.50 - 7.70 x10 (3) uL    Neutrophil Absolute 2.71 1.50 - 7.70 x10(3) uL    Lymphocyte Absol 1200 MG Oral Cap Take 1 capsule by mouth daily. Disp:  Rfl:    hydrocortisone (ANUSOL-HC) 2.5 % Rectal Cream Apply 1 Application topically 2 (two) times daily as needed.  Disp:  Rfl:    Multiple Vitamin (MULTIVITAMINS) Oral Cap Take 1 capsule by mouth daily oz.   Vital signs reviewed. Physical Exam:  GEN: Smiling and alert. She is very active and energetic. She was able to climb on the exam table without any assistance.   HEENT:  Head:  Normocephalic, atraumatic Eyes: EOMI, PERRLA, no scleral icterus, conju pathological fracture  She does have osteoporosis but does not have any signs or symptoms of bone disease now.  - OFFICE/OUTPT VISIT,EST,LEVL III    6. Postmenopausal atrophic vaginitis  She has a history of postmenopausal atrophic vaginitis.   It is not ca Osteoporosis     Polymyalgia rheumatica ([facility] [address]     History of tonsillectomy     Postmenopausal atrophic vaginitis     Warts     HTN (hypertension), benign     Age-related nuclear cataract of both eyes     Preop examination      Baljeet Monroy MD  5/28/20

## 2019-05-29 NOTE — TELEPHONE ENCOUNTER
Pt returned call and was notified. Pt verbalized understanding. appt scheduled.       Future Appointments   Date Time Provider [facility]   6/18/2019  4:30 PM Pacheco Cid MD [facility]

## 2019-06-11 ENCOUNTER — MED REC SCAN ONLY (OUTPATIENT)
Dept: FAMILY MEDICINE CLINIC | Facility: CLINIC | Age: 84
End: 2019-06-11

## 2019-06-18 ENCOUNTER — OFFICE VISIT (OUTPATIENT)
Dept: FAMILY MEDICINE CLINIC | Facility: CLINIC | Age: 84
End: 2019-06-18
Payer: MEDICARE

## 2019-06-18 VITALS
SYSTOLIC BLOOD PRESSURE: 118 MMHG | HEIGHT: 62 IN | WEIGHT: 165.19 LBS | RESPIRATION RATE: 14 BRPM | DIASTOLIC BLOOD PRESSURE: 64 MMHG | BODY MASS INDEX: 30.4 KG/M2 | HEART RATE: 80 BPM | OXYGEN SATURATION: 97 % | TEMPERATURE: 98 F

## 2019-06-18 DIAGNOSIS — I10 HTN (HYPERTENSION), BENIGN: Primary | ICD-10-CM

## 2019-06-18 PROCEDURE — 99213 OFFICE O/P EST LOW 20 MIN: CPT | Performed by: FAMILY MEDICINE

## 2019-06-18 RX ORDER — VIT A/VIT C/VIT E/ZINC/COPPER 2148-113
2 TABLET ORAL DAILY
COMMUNITY

## 2019-06-18 NOTE — PROGRESS NOTES
[facility] [place]  PROGRESS NOTE  Chief Complaint:   Patient presents with:  Hypertension: F/U since med change      HPI:   This is a 80year old female coming in for follow-up on her new medication. The pharmacy was unable to obtain losartan. - 48.0 %    .0 150.0 - 450.0 10(3)uL    MCV 97.1 80.0 - 100.0 fL    MCH 32.8 26.0 - 34.0 pg    MCHC 33.8 31.0 - 37.0 g/dL    RDW 12.4 11.0 - 15.0 %    RDW-SD 44.4 35.1 - 46.3 fL    Neutrophil Absolute Prelim 2.71 1.50 - 7.70 x10 (3) uL    Neutrophil Carb-Cholecalciferol (CALCIUM 500+D) 500-200 MG-UNIT Oral Tab Take 1 tablet by mouth daily. Disp:  Rfl:    Omega-3 Fatty Acids ([employer] FISH OIL) 1200 MG Oral Cap Take 1 capsule by mouth daily.  Disp:  Rfl:    hydrocortisone (ANUSOL-HC) 2.5 % Rectal Cream Apply following:    Height as of this encounter: 62\". Weight as of this encounter: 165 lb 3.2 oz. Vital signs reviewed. Appears stated age, well groomed.   Physical Exam:  GEN:  Patient is alert, awake and oriented, well developed, well nourished, no appar Age-related nuclear cataract of both eyes     Preop examination      Hoang Dale MD  6/18/2019  4:57 PM

## 2019-06-27 RX ORDER — LOSARTAN POTASSIUM AND HYDROCHLOROTHIAZIDE 12.5; 5 MG/1; MG/1
1 TABLET ORAL DAILY
Qty: 90 TABLET | Refills: 0 | OUTPATIENT
Start: 2019-06-27

## 2019-06-27 NOTE — TELEPHONE ENCOUNTER
See phone note dated 5/28/2019; Patient was changed to Lisinopril due to Losartan being on backorder. Request denied with this notation.

## 2019-11-11 RX ORDER — LISINOPRIL 10 MG/1
TABLET ORAL
Qty: 90 TABLET | Refills: 1 | Status: SHIPPED | OUTPATIENT
Start: 2019-11-11 | End: 2020-05-04

## 2019-11-11 NOTE — TELEPHONE ENCOUNTER
Future appt:    Last Appointment:  6/18/2019  Cholesterol, Total (mg/dL)   Date Value   05/21/2019 187     HDL Cholesterol (mg/dL)   Date Value   05/21/2019 82 (H)     LDL Cholesterol (mg/dL)   Date Value   05/21/2019 90     Triglycerides (mg/dL)   Date Va

## 2020-05-04 RX ORDER — LISINOPRIL 10 MG/1
TABLET ORAL
Qty: 90 TABLET | Refills: 0 | Status: SHIPPED | OUTPATIENT
Start: 2020-05-04 | End: 2020-08-01

## 2020-05-04 NOTE — TELEPHONE ENCOUNTER
Future appt:     Your appointments     Date & Time Appointment Department [facility])    May 29, 2020 10:00 AM CDT Medicare Annual Well Visit with Carey Carbajal MD [address] [place], [place] [address]

## 2020-05-28 ENCOUNTER — TELEPHONE (OUTPATIENT)
Dept: FAMILY MEDICINE CLINIC | Facility: CLINIC | Age: 85
End: 2020-05-28

## 2020-05-28 NOTE — TELEPHONE ENCOUNTER
Future Appointments   Date Time Provider [facility]   5/29/2020 10:00 AM Fatuma Barker MD [facility] [place] [facility] [place]     Patient called to confirm her appt for 5/29/2020 and when asked the travel screening questions, she said she does have a sli

## 2020-05-28 NOTE — TELEPHONE ENCOUNTER
Patient informed the front office that she does have a slight cough at times. States she lives alone so she doesn't talk very much. States when she starts to talk, she sometimes needs to cough and thinks it's just because she needs a drink of water.   Sta

## 2020-05-29 ENCOUNTER — OFFICE VISIT (OUTPATIENT)
Dept: FAMILY MEDICINE CLINIC | Facility: CLINIC | Age: 85
End: 2020-05-29
Payer: MEDICARE

## 2020-05-29 ENCOUNTER — APPOINTMENT (OUTPATIENT)
Dept: LAB | Age: 85
End: 2020-05-29
Attending: FAMILY MEDICINE
Payer: MEDICARE

## 2020-05-29 VITALS
HEIGHT: 62 IN | HEART RATE: 86 BPM | DIASTOLIC BLOOD PRESSURE: 76 MMHG | OXYGEN SATURATION: 97 % | BODY MASS INDEX: 30.91 KG/M2 | SYSTOLIC BLOOD PRESSURE: 140 MMHG | RESPIRATION RATE: 18 BRPM | TEMPERATURE: 98 F | WEIGHT: 168 LBS

## 2020-05-29 DIAGNOSIS — R30.0 DYSURIA: ICD-10-CM

## 2020-05-29 DIAGNOSIS — L30.1 DYSHIDROTIC ECZEMA: ICD-10-CM

## 2020-05-29 DIAGNOSIS — M35.3 POLYMYALGIA RHEUMATICA (HCC): ICD-10-CM

## 2020-05-29 DIAGNOSIS — Z00.00 ENCOUNTER FOR ANNUAL HEALTH EXAMINATION: Primary | ICD-10-CM

## 2020-05-29 DIAGNOSIS — M81.0 AGE-RELATED OSTEOPOROSIS WITHOUT CURRENT PATHOLOGICAL FRACTURE: ICD-10-CM

## 2020-05-29 DIAGNOSIS — M15.9 PRIMARY OSTEOARTHRITIS INVOLVING MULTIPLE JOINTS: ICD-10-CM

## 2020-05-29 DIAGNOSIS — I10 HTN (HYPERTENSION), BENIGN: ICD-10-CM

## 2020-05-29 DIAGNOSIS — H35.3132 INTERMEDIATE STAGE NONEXUDATIVE AGE-RELATED MACULAR DEGENERATION OF BOTH EYES: ICD-10-CM

## 2020-05-29 PROBLEM — H25.819 COMBINED FORM OF SENILE CATARACT: Status: ACTIVE | Noted: 2018-10-01

## 2020-05-29 PROBLEM — H35.349 MACULAR HOLE: Status: ACTIVE | Noted: 2019-01-29

## 2020-05-29 PROBLEM — Z96.1 BILATERAL PSEUDOPHAKIA: Status: ACTIVE | Noted: 2019-01-10

## 2020-05-29 PROBLEM — H35.349 MACULAR RETINAL CYST: Status: ACTIVE | Noted: 2018-10-01

## 2020-05-29 PROBLEM — H35.3130 BILATERAL NONEXUDATIVE AGE-RELATED MACULAR DEGENERATION: Status: ACTIVE | Noted: 2018-10-01

## 2020-05-29 PROCEDURE — 81003 URINALYSIS AUTO W/O SCOPE: CPT | Performed by: FAMILY MEDICINE

## 2020-05-29 PROCEDURE — 80061 LIPID PANEL: CPT | Performed by: FAMILY MEDICINE

## 2020-05-29 PROCEDURE — 84443 ASSAY THYROID STIM HORMONE: CPT | Performed by: FAMILY MEDICINE

## 2020-05-29 PROCEDURE — 85652 RBC SED RATE AUTOMATED: CPT | Performed by: FAMILY MEDICINE

## 2020-05-29 PROCEDURE — 80053 COMPREHEN METABOLIC PANEL: CPT | Performed by: FAMILY MEDICINE

## 2020-05-29 PROCEDURE — 85025 COMPLETE CBC W/AUTO DIFF WBC: CPT | Performed by: FAMILY MEDICINE

## 2020-05-29 PROCEDURE — 99213 OFFICE O/P EST LOW 20 MIN: CPT | Performed by: FAMILY MEDICINE

## 2020-05-29 PROCEDURE — 84550 ASSAY OF BLOOD/URIC ACID: CPT | Performed by: FAMILY MEDICINE

## 2020-05-29 PROCEDURE — 36415 COLL VENOUS BLD VENIPUNCTURE: CPT | Performed by: FAMILY MEDICINE

## 2020-05-29 PROCEDURE — G0439 PPPS, SUBSEQ VISIT: HCPCS | Performed by: FAMILY MEDICINE

## 2020-05-29 NOTE — PROGRESS NOTES
HPI:   Kathie Lopez is a 80year old female who presents for a Medicare annual wellness exam.    Her last annual assessment has been over 1 year: Annual Physical due on 05/28/2020       She said that she lives alone now. She has been feeling good overall. breast     History of partial colectomy     H/O colonoscopy     Diverticulosis of colon     Eczema     History of bilateral oophorectomy     Osteoporosis     Polymyalgia rheumatica (HCC)     History of tonsillectomy     Postmenopausal atrophic vaginitis (5/5/2008), Eczema (4/23/2013), Hemorrhoids (8/11/2014), HTN (hypertension), benign (5/23/2017), Osteoporosis (5/23/2007), Polymyalgia rheumatica ([facility] [address] (10/5/2007), and Postmenopausal atrophic vaginitis (4/22/2008).     She  has a past surgical history that Eyes:      Extraocular Movements: Extraocular movements intact. Conjunctiva/sclera: Conjunctivae normal.      Pupils: Pupils are equal, round, and reactive to light. Neck:      Musculoskeletal: Normal range of motion.    Cardiovascular:      Rate a URIC ACID, SERUM; Future  -     SED RATE, WESTERGREN (AUTOMATED); Future  -     OFFICE/OUTPT VISIT,EST,LEVL III    Dysuria  -     URINALYSIS, AUTO, W/O SCOPE  -     CBC WITH DIFFERENTIAL WITH PLATELET; Future  -     COMP METABOLIC PANEL (14);  Future  - trying?: 2 - No  Has your appetite been poor?: No  How does the patient maintain a good energy level?: Daily Walks; Appropriate Exercise;Stretching  How would you describe your daily physical activity?: Moderate  How would you describe your current health s flowsheet data found.     Screening Mammogram      Mammogram Annually to 76, then as discussed Mammogram due on 09/25/2019 Update Health Maintenance if applicable     Immunizations (Update Immunization Activity if applicable)     Influenza  Covered Annually

## 2020-05-29 NOTE — PATIENT INSTRUCTIONS
Recommended Websites for Advanced Directives    SeekAlumni.[email or web address]. org/publications/Documents/personal_dec. pdf  An information packet, including necessary form from the [employer] website. http://www. [place]state. [place]/public/books/adv

## 2020-06-01 ENCOUNTER — TELEPHONE (OUTPATIENT)
Dept: FAMILY MEDICINE CLINIC | Facility: CLINIC | Age: 85
End: 2020-06-01

## 2020-06-01 NOTE — TELEPHONE ENCOUNTER
----- Message from Nga Posey MD sent at 6/1/2020  1:58 PM CDT -----  Please call Kathie. Her labs look very good overall. Her urine is clear.   Her blood sugar is 100 which means that she may develop diabetes in the future but she does not have it n

## 2020-08-01 NOTE — TELEPHONE ENCOUNTER
Future appt:     Last Appointment with provider:   5/29/2020; Return in 1 year (on 5/29/2021).      Last appointment at [facility]:  5/29/2020  Cholesterol, Total (mg/dL)   Date Value   05/29/2020 219 (H)     HDL Cholesterol (mg/dL)   Date Value   05/29/2

## 2020-08-03 RX ORDER — LISINOPRIL 10 MG/1
TABLET ORAL
Qty: 90 TABLET | Refills: 1 | Status: SHIPPED | OUTPATIENT
Start: 2020-08-03 | End: 2021-01-25

## 2021-01-25 RX ORDER — LISINOPRIL 10 MG/1
TABLET ORAL
Qty: 90 TABLET | Refills: 1 | Status: SHIPPED | OUTPATIENT
Start: 2021-01-25 | End: 2021-06-01

## 2021-01-25 NOTE — TELEPHONE ENCOUNTER
Future appt:     Last Appointment with provider:  5/29/2020; Return in 1 year (on 5/29/2021).     Last appointment at [facility]:  Visit date not found  Cholesterol, Total (mg/dL)   Date Value   05/29/2020 219 (H)     HDL Cholesterol (mg/dL)   Date Value

## 2021-03-09 DIAGNOSIS — Z23 NEED FOR VACCINATION: ICD-10-CM

## 2021-06-01 ENCOUNTER — LAB ENCOUNTER (OUTPATIENT)
Dept: LAB | Age: 86
End: 2021-06-01
Attending: FAMILY MEDICINE
Payer: MEDICARE

## 2021-06-01 ENCOUNTER — OFFICE VISIT (OUTPATIENT)
Dept: FAMILY MEDICINE CLINIC | Facility: CLINIC | Age: 86
End: 2021-06-01
Payer: MEDICARE

## 2021-06-01 VITALS
SYSTOLIC BLOOD PRESSURE: 142 MMHG | OXYGEN SATURATION: 99 % | HEIGHT: 62 IN | DIASTOLIC BLOOD PRESSURE: 74 MMHG | WEIGHT: 170 LBS | TEMPERATURE: 99 F | RESPIRATION RATE: 16 BRPM | HEART RATE: 68 BPM | BODY MASS INDEX: 31.28 KG/M2

## 2021-06-01 DIAGNOSIS — Z90.49 HISTORY OF PARTIAL COLECTOMY: ICD-10-CM

## 2021-06-01 DIAGNOSIS — I10 HTN (HYPERTENSION), BENIGN: ICD-10-CM

## 2021-06-01 DIAGNOSIS — Z90.722 HISTORY OF BILATERAL OOPHORECTOMY: ICD-10-CM

## 2021-06-01 DIAGNOSIS — M15.9 PRIMARY OSTEOARTHRITIS INVOLVING MULTIPLE JOINTS: ICD-10-CM

## 2021-06-01 DIAGNOSIS — Z98.890 H/O COLONOSCOPY: ICD-10-CM

## 2021-06-01 DIAGNOSIS — M81.0 AGE-RELATED OSTEOPOROSIS WITHOUT CURRENT PATHOLOGICAL FRACTURE: ICD-10-CM

## 2021-06-01 DIAGNOSIS — H35.3132 INTERMEDIATE STAGE NONEXUDATIVE AGE-RELATED MACULAR DEGENERATION OF BOTH EYES: ICD-10-CM

## 2021-06-01 DIAGNOSIS — K57.30 DIVERTICULOSIS OF COLON: ICD-10-CM

## 2021-06-01 DIAGNOSIS — L30.1 DYSHIDROTIC ECZEMA: ICD-10-CM

## 2021-06-01 DIAGNOSIS — L20.82 FLEXURAL ECZEMA: ICD-10-CM

## 2021-06-01 DIAGNOSIS — Z90.89 HISTORY OF TONSILLECTOMY: ICD-10-CM

## 2021-06-01 DIAGNOSIS — M35.3 POLYMYALGIA RHEUMATICA (HCC): ICD-10-CM

## 2021-06-01 DIAGNOSIS — Z00.00 ENCOUNTER FOR ANNUAL HEALTH EXAMINATION: Primary | ICD-10-CM

## 2021-06-01 DIAGNOSIS — N95.2 POSTMENOPAUSAL ATROPHIC VAGINITIS: ICD-10-CM

## 2021-06-01 DIAGNOSIS — Z13.6 SCREENING FOR CARDIOVASCULAR CONDITION: ICD-10-CM

## 2021-06-01 PROBLEM — Z01.818 PREOP EXAMINATION: Status: RESOLVED | Noted: 2018-10-02 | Resolved: 2021-06-01

## 2021-06-01 PROBLEM — Z96.1 BILATERAL PSEUDOPHAKIA: Status: RESOLVED | Noted: 2019-01-10 | Resolved: 2021-06-01

## 2021-06-01 PROBLEM — H35.349 MACULAR RETINAL CYST: Status: RESOLVED | Noted: 2018-10-01 | Resolved: 2021-06-01

## 2021-06-01 PROBLEM — H35.349 MACULAR HOLE: Status: RESOLVED | Noted: 2019-01-29 | Resolved: 2021-06-01

## 2021-06-01 PROBLEM — B07.9 WARTS: Status: RESOLVED | Noted: 2017-05-23 | Resolved: 2021-06-01

## 2021-06-01 PROBLEM — H25.819 COMBINED FORM OF SENILE CATARACT: Status: RESOLVED | Noted: 2018-10-01 | Resolved: 2021-06-01

## 2021-06-01 PROBLEM — H25.13 AGE-RELATED NUCLEAR CATARACT OF BOTH EYES: Status: RESOLVED | Noted: 2018-10-02 | Resolved: 2021-06-01

## 2021-06-01 PROBLEM — R30.0 DYSURIA: Status: RESOLVED | Noted: 2020-05-29 | Resolved: 2021-06-01

## 2021-06-01 PROCEDURE — 80061 LIPID PANEL: CPT | Performed by: FAMILY MEDICINE

## 2021-06-01 PROCEDURE — 99213 OFFICE O/P EST LOW 20 MIN: CPT | Performed by: FAMILY MEDICINE

## 2021-06-01 PROCEDURE — 84443 ASSAY THYROID STIM HORMONE: CPT | Performed by: FAMILY MEDICINE

## 2021-06-01 PROCEDURE — 85025 COMPLETE CBC W/AUTO DIFF WBC: CPT | Performed by: FAMILY MEDICINE

## 2021-06-01 PROCEDURE — G0439 PPPS, SUBSEQ VISIT: HCPCS | Performed by: FAMILY MEDICINE

## 2021-06-01 PROCEDURE — 36415 COLL VENOUS BLD VENIPUNCTURE: CPT | Performed by: FAMILY MEDICINE

## 2021-06-01 PROCEDURE — 80053 COMPREHEN METABOLIC PANEL: CPT | Performed by: FAMILY MEDICINE

## 2021-06-01 RX ORDER — LISINOPRIL 10 MG/1
10 TABLET ORAL DAILY
Qty: 90 TABLET | Refills: 1 | Status: SHIPPED | OUTPATIENT
Start: 2021-06-01 | End: 2022-01-12

## 2021-06-01 NOTE — PATIENT INSTRUCTIONS
Please schedule mammogram at [facility].            Recommended Websites for Advanced Directives    [email or web address]. org/publications/Documents/personal_dec. pdf  An information packet, including necessary form from the [employer]

## 2021-06-01 NOTE — PROGRESS NOTES
REASON FOR VISIT:    Kathie Hussein is a 80year old female who presents for a Medicare Annual Wellness visit. She has been feeling good overall. She has been getting around well. She no longer has the muscular pain of Polymyalgia Rheumatica.       [facility] Manual Rng & Units 5/29/2020 5/21/2019 12/28/2018 5/21/2018 5/19/2017   BUN 7 - 18 mg/dL 15 14 16 12 13   Creatinine 0.55 - 1.02 mg/dL 0.69 0.73 0.78 0.70 0.77     AST and ALT Latest Ref Rng & Units 5/29/2020 5/21/2019 12/28/2018 5/21/2018 5/19/2017   AST 15 - 37 Correct  What year is it?: Correct  Recall \"Ball\": Correct  Recall \"Flag\": Correct  Recall \"Tree\": Correct         PREVENTATIVE SERVICES   INDICATIONS AND SCHEDULE Internal Lab or Procedure   Diabetes Screening     HbgA1C   Annually No results found HISTORY      Brain Abcess/Oopherectomy   • TONSILLECTOMY        History reviewed. No pertinent family history.   Immunization History      Immunization History  Administered            Date(s) Administered    Covid-19 Vaccine Moderna 100 mcg/0.5 ml Assessed via: Finger Rub  EYES: PERRLA, EOMI, conjunctiva are clear    NECK: supple, no adenopathy, no bruits  CHEST: no chest tenderness  BREAST:deferred   LUNGS: clear to auscultation  CARDIO: RRR without murmur  GI: good BS's, no masses, HSM or tenderne involving multiple joints  She does have osteoarthritis in multiple joints but she does not have a lot of symptoms at this time. No new treatment now.  - OFFICE/OUTPT VISIT,EST,LEVL III  - LIPID PANEL; Future  - COMP METABOLIC PANEL (14);  Future  - TSH W vaccine is due

## 2021-06-02 ENCOUNTER — TELEPHONE (OUTPATIENT)
Dept: FAMILY MEDICINE CLINIC | Facility: CLINIC | Age: 86
End: 2021-06-02

## 2021-06-02 NOTE — TELEPHONE ENCOUNTER
----- Message from FRANCESCO Lopez sent at 6/2/2021  3:10 PM CDT -----  Dr. Abe Singh notify patient that her blood work looks really good her cholesterol is good at 200-HDLs are very good at 74, LDLs are good at 108, triglycerides ar

## 2021-10-27 ENCOUNTER — OFFICE VISIT (OUTPATIENT)
Dept: FAMILY MEDICINE CLINIC | Facility: CLINIC | Age: 86
End: 2021-10-27
Payer: MEDICARE

## 2021-10-27 VITALS
HEIGHT: 62 IN | BODY MASS INDEX: 30.58 KG/M2 | RESPIRATION RATE: 20 BRPM | WEIGHT: 166.19 LBS | DIASTOLIC BLOOD PRESSURE: 74 MMHG | SYSTOLIC BLOOD PRESSURE: 128 MMHG | HEART RATE: 92 BPM | TEMPERATURE: 98 F

## 2021-10-27 DIAGNOSIS — D22.9 NEVUS: Primary | ICD-10-CM

## 2021-10-27 DIAGNOSIS — Z23 NEED FOR VACCINATION: ICD-10-CM

## 2021-10-27 DIAGNOSIS — Z23 NEED FOR INFLUENZA VACCINATION: ICD-10-CM

## 2021-10-27 PROCEDURE — 90662 IIV NO PRSV INCREASED AG IM: CPT | Performed by: NURSE PRACTITIONER

## 2021-10-27 PROCEDURE — G0008 ADMIN INFLUENZA VIRUS VAC: HCPCS | Performed by: NURSE PRACTITIONER

## 2021-10-27 PROCEDURE — 99213 OFFICE O/P EST LOW 20 MIN: CPT | Performed by: NURSE PRACTITIONER

## 2021-10-27 NOTE — PROGRESS NOTES
Chandler Tanner is a 80year old female.   Patient presents with:  Lump: left forearm      HPI:   Complaints of lump to left forearm- for years - thinks it may have grown - it itches    Patient states she has a small mole to the right side of her face–it has n Allergies    REVIEW OF SYSTEMS:   GENERAL HEALTH: feels well otherwise  HEENT: denies complaints  SKIN: See HPI  RESPIRATORY: denies shortness of breath with exertion, no cough  CARDIOVASCULAR: denies complaints   GI: denies abdominal pain, nausea, vomitin

## 2021-10-27 NOTE — PATIENT INSTRUCTIONS
Recommend that you follow-up with dermatology for an evaluation and possible removal of mole. –See referral

## 2022-01-12 RX ORDER — LISINOPRIL 10 MG/1
TABLET ORAL
Qty: 90 TABLET | Refills: 1 | Status: SHIPPED | OUTPATIENT
Start: 2022-01-12

## 2022-01-12 NOTE — TELEPHONE ENCOUNTER
Lisinopril: 6/1/21       Return in 1 year (on 6/1/2022).     Future appt:    Last Appointment with provider:   Visit date not found  Last appointment at [facility]:  10/27/2021  Cholesterol, Total (mg/dL)   Date Value   06/01/2021 200 (H)     HDL Chol[place]

## 2022-06-20 ENCOUNTER — LAB ENCOUNTER (OUTPATIENT)
Dept: LAB | Age: 87
End: 2022-06-20
Attending: FAMILY MEDICINE
Payer: MEDICARE

## 2022-06-20 ENCOUNTER — OFFICE VISIT (OUTPATIENT)
Dept: FAMILY MEDICINE CLINIC | Facility: CLINIC | Age: 87
End: 2022-06-20
Payer: MEDICARE

## 2022-06-20 VITALS
RESPIRATION RATE: 16 BRPM | BODY MASS INDEX: 30.36 KG/M2 | OXYGEN SATURATION: 97 % | HEART RATE: 88 BPM | SYSTOLIC BLOOD PRESSURE: 144 MMHG | HEIGHT: 62 IN | WEIGHT: 165 LBS | TEMPERATURE: 98 F | DIASTOLIC BLOOD PRESSURE: 88 MMHG

## 2022-06-20 DIAGNOSIS — M15.9 PRIMARY OSTEOARTHRITIS INVOLVING MULTIPLE JOINTS: ICD-10-CM

## 2022-06-20 DIAGNOSIS — K57.30 DIVERTICULOSIS OF COLON: ICD-10-CM

## 2022-06-20 DIAGNOSIS — Z90.49 HISTORY OF PARTIAL COLECTOMY: ICD-10-CM

## 2022-06-20 DIAGNOSIS — H35.3132 INTERMEDIATE STAGE NONEXUDATIVE AGE-RELATED MACULAR DEGENERATION OF BOTH EYES: ICD-10-CM

## 2022-06-20 DIAGNOSIS — Z13.6 SCREENING FOR CARDIOVASCULAR CONDITION: ICD-10-CM

## 2022-06-20 DIAGNOSIS — L30.1 DYSHIDROTIC ECZEMA: ICD-10-CM

## 2022-06-20 DIAGNOSIS — L20.82 FLEXURAL ECZEMA: ICD-10-CM

## 2022-06-20 DIAGNOSIS — M81.0 AGE-RELATED OSTEOPOROSIS WITHOUT CURRENT PATHOLOGICAL FRACTURE: ICD-10-CM

## 2022-06-20 DIAGNOSIS — I10 HTN (HYPERTENSION), BENIGN: ICD-10-CM

## 2022-06-20 DIAGNOSIS — Z00.00 ENCOUNTER FOR ANNUAL HEALTH EXAMINATION: Primary | ICD-10-CM

## 2022-06-20 LAB
ALBUMIN SERPL-MCNC: 4 G/DL (ref 3.4–5)
ALBUMIN/GLOB SERPL: 1.1 {RATIO} (ref 1–2)
ALP LIVER SERPL-CCNC: 57 U/L
ALT SERPL-CCNC: 22 U/L
ANION GAP SERPL CALC-SCNC: 8 MMOL/L (ref 0–18)
AST SERPL-CCNC: 21 U/L (ref 15–37)
BASOPHILS # BLD AUTO: 0.05 X10(3) UL (ref 0–0.2)
BASOPHILS NFR BLD AUTO: 0.8 %
BILIRUB SERPL-MCNC: 1.1 MG/DL (ref 0.1–2)
BUN BLD-MCNC: 13 MG/DL (ref 7–18)
CALCIUM BLD-MCNC: 9.3 MG/DL (ref 8.5–10.1)
CHLORIDE SERPL-SCNC: 104 MMOL/L (ref 98–112)
CHOLEST SERPL-MCNC: 202 MG/DL (ref ?–200)
CO2 SERPL-SCNC: 25 MMOL/L (ref 21–32)
CREAT BLD-MCNC: 0.79 MG/DL
EOSINOPHIL # BLD AUTO: 0.08 X10(3) UL (ref 0–0.7)
EOSINOPHIL NFR BLD AUTO: 1.2 %
ERYTHROCYTE [DISTWIDTH] IN BLOOD BY AUTOMATED COUNT: 12.8 %
FASTING PATIENT LIPID ANSWER: YES
FASTING STATUS PATIENT QL REPORTED: YES
GLOBULIN PLAS-MCNC: 3.8 G/DL (ref 2.8–4.4)
GLUCOSE BLD-MCNC: 101 MG/DL (ref 70–99)
HCT VFR BLD AUTO: 40.9 %
HDLC SERPL-MCNC: 70 MG/DL (ref 40–59)
HGB BLD-MCNC: 13.7 G/DL
IMM GRANULOCYTES # BLD AUTO: 0.01 X10(3) UL (ref 0–1)
IMM GRANULOCYTES NFR BLD: 0.2 %
LDLC SERPL CALC-MCNC: 113 MG/DL (ref ?–100)
LYMPHOCYTES # BLD AUTO: 2.01 X10(3) UL (ref 1–4)
LYMPHOCYTES NFR BLD AUTO: 31.2 %
MCH RBC QN AUTO: 31.9 PG (ref 26–34)
MCHC RBC AUTO-ENTMCNC: 33.5 G/DL (ref 31–37)
MCV RBC AUTO: 95.3 FL
MONOCYTES # BLD AUTO: 0.72 X10(3) UL (ref 0.1–1)
MONOCYTES NFR BLD AUTO: 11.2 %
NEUTROPHILS # BLD AUTO: 3.58 X10 (3) UL (ref 1.5–7.7)
NEUTROPHILS # BLD AUTO: 3.58 X10(3) UL (ref 1.5–7.7)
NEUTROPHILS NFR BLD AUTO: 55.4 %
NONHDLC SERPL-MCNC: 132 MG/DL (ref ?–130)
OSMOLALITY SERPL CALC.SUM OF ELEC: 284 MOSM/KG (ref 275–295)
PLATELET # BLD AUTO: 317 10(3)UL (ref 150–450)
POTASSIUM SERPL-SCNC: 4.2 MMOL/L (ref 3.5–5.1)
PROT SERPL-MCNC: 7.8 G/DL (ref 6.4–8.2)
RBC # BLD AUTO: 4.29 X10(6)UL
SODIUM SERPL-SCNC: 137 MMOL/L (ref 136–145)
TRIGL SERPL-MCNC: 105 MG/DL (ref 30–149)
TSI SER-ACNC: 1.51 MIU/ML (ref 0.36–3.74)
VLDLC SERPL CALC-MCNC: 18 MG/DL (ref 0–30)
WBC # BLD AUTO: 6.5 X10(3) UL (ref 4–11)

## 2022-06-20 PROCEDURE — 84443 ASSAY THYROID STIM HORMONE: CPT

## 2022-06-20 PROCEDURE — 36415 COLL VENOUS BLD VENIPUNCTURE: CPT

## 2022-06-20 PROCEDURE — 80061 LIPID PANEL: CPT

## 2022-06-20 PROCEDURE — 80053 COMPREHEN METABOLIC PANEL: CPT

## 2022-06-20 PROCEDURE — 85025 COMPLETE CBC W/AUTO DIFF WBC: CPT

## 2022-06-20 RX ORDER — LISINOPRIL 10 MG/1
10 TABLET ORAL DAILY
Qty: 90 TABLET | Refills: 3 | Status: SHIPPED | OUTPATIENT
Start: 2022-06-20

## 2022-06-21 ENCOUNTER — TELEPHONE (OUTPATIENT)
Dept: FAMILY MEDICINE CLINIC | Facility: CLINIC | Age: 87
End: 2022-06-21

## 2022-06-21 NOTE — TELEPHONE ENCOUNTER
----- Message from Raji Machuca MD sent at 6/21/2022 11:01 AM CDT -----  Thyroid is normal (TSH). Glucose is slightly elevated at 101. Electrolytes are normal.  Cholesterol is normal at 202. CBC is normal.Impression:  labs look good.

## 2022-06-22 NOTE — TELEPHONE ENCOUNTER
Patient read [address] [facility]. Future appt:    Last Appointment with provider:   6/20/2022  Last appointment at [facility]:  6/20/2022  Cholesterol, Total (mg/dL)   Date Value   06/20/2022 202 (H)     HDL Cholesterol (mg/dL)   Date Value   06/20/2022 70 (H)     LDL Cholesterol (mg/dL)   Date Value   06/20/2022 113 (H)     Triglycerides (mg/dL)   Date Value   06/20/2022 105     No results found for: EAG, A1C  Lab Results   Component Value Date    TSH 1.510 06/20/2022       No follow-ups on file.

## 2022-06-23 ENCOUNTER — TELEPHONE (OUTPATIENT)
Dept: FAMILY MEDICINE CLINIC | Facility: CLINIC | Age: 87
End: 2022-06-23

## 2022-11-22 ENCOUNTER — TELEPHONE (OUTPATIENT)
Dept: FAMILY MEDICINE CLINIC | Facility: CLINIC | Age: 87
End: 2022-11-22

## 2022-11-22 ENCOUNTER — OFFICE VISIT (OUTPATIENT)
Dept: FAMILY MEDICINE CLINIC | Facility: CLINIC | Age: 87
End: 2022-11-22
Payer: MEDICARE

## 2022-11-22 VITALS — SYSTOLIC BLOOD PRESSURE: 144 MMHG | DIASTOLIC BLOOD PRESSURE: 78 MMHG | TEMPERATURE: 101 F | HEART RATE: 110 BPM

## 2022-11-22 DIAGNOSIS — J06.9 UPPER RESPIRATORY TRACT INFECTION, UNSPECIFIED TYPE: Primary | ICD-10-CM

## 2022-11-22 DIAGNOSIS — J06.9 VIRAL UPPER RESPIRATORY TRACT INFECTION: ICD-10-CM

## 2022-11-22 PROCEDURE — 87637 SARSCOV2&INF A&B&RSV AMP PRB: CPT

## 2022-11-22 PROCEDURE — 99213 OFFICE O/P EST LOW 20 MIN: CPT

## 2022-11-22 NOTE — TELEPHONE ENCOUNTER
Patient has had a sore throat since Saturday. States he Home [id number] Test was negative  On Monday. Appointment given today with Respiratory for  Evaluation of symptoms. Future appt: Your appointments     Date & Time Appointment Department [facility])    Nov 22, 2022  3:00 PM CST Exam - Established with Rl Lawson, [address] [place] [address] ([facility])            [address] [place] [address] [facility] [id number]  [phone]        Last Appointment with provider:   6/20/2022  Last appointment at [facility]:  6/20/2022  Cholesterol, Total (mg/dL)   Date Value   06/20/2022 202 (H)     HDL Cholesterol (mg/dL)   Date Value   06/20/2022 70 (H)     LDL Cholesterol (mg/dL)   Date Value   06/20/2022 113 (H)     Triglycerides (mg/dL)   Date Value   06/20/2022 105     No results found for: EAG, A1C  Lab Results   Component Value Date    TSH 1.510 06/20/2022       No follow-ups on file.

## 2022-11-23 LAB
FLUAV + FLUBV RNA SPEC NAA+PROBE: NOT DETECTED
FLUAV + FLUBV RNA SPEC NAA+PROBE: NOT DETECTED
RSV RNA SPEC NAA+PROBE: NOT DETECTED
SARS-COV-2 RNA RESP QL NAA+PROBE: NOT DETECTED

## 2023-04-11 LAB — AMB EXT COVID-19 RESULT: DETECTED

## 2023-04-13 ENCOUNTER — TELEMEDICINE (OUTPATIENT)
Dept: FAMILY MEDICINE CLINIC | Facility: CLINIC | Age: 88
End: 2023-04-13
Payer: MEDICARE

## 2023-04-13 ENCOUNTER — TELEPHONE (OUTPATIENT)
Dept: FAMILY MEDICINE CLINIC | Facility: CLINIC | Age: 88
End: 2023-04-13

## 2023-04-13 VITALS — BODY MASS INDEX: 30.36 KG/M2 | TEMPERATURE: 98 F | WEIGHT: 165 LBS | HEIGHT: 62 IN

## 2023-04-13 DIAGNOSIS — J34.89 STUFFY AND RUNNY NOSE: ICD-10-CM

## 2023-04-13 DIAGNOSIS — U07.1 COVID-19: Primary | ICD-10-CM

## 2023-04-13 PROCEDURE — 99442 PHONE E/M BY PHYS 11-20 MIN: CPT | Performed by: NURSE PRACTITIONER

## 2023-04-13 RX ORDER — FLUTICASONE PROPIONATE 50 MCG
2 SPRAY, SUSPENSION (ML) NASAL DAILY
Qty: 1 EACH | Refills: 0 | Status: SHIPPED | OUTPATIENT
Start: 2023-04-13

## 2023-04-13 NOTE — PATIENT INSTRUCTIONS
Covid positive, mild symptoms.   High risk patient with age and hypertension; start paxlovid, reviewed side effects and interactions,   Coricidin cold and cough, nasal fluticasone   Deep breathing exercises 10 times an hour while awake, prone positioning intermittently during the day reviewed  Fluids (pedialyte, broth, liquid iv, gatorade) for hydration  Light activity alternating with rest as able  Obtain pulse oximeter, monitor oxygen levels  ER precautions reviewed for worsening/severe symptoms, shortness of breath, or oxygen 90% or below

## 2023-04-13 NOTE — PROGRESS NOTES
Virtual Telephone Check-In    June A Maddie verbally consents to a Virtual/Telephone Check-In visit on 04/13/23. Patient has been referred to the [facility] website at [email or web address] to review the yearly Consent to Treat document. Patient understands and accepts financial responsibility for any deductible, co-insurance and/or co-pays associated with this service. Duration of the service: 16 minutes    Changed to phone visit due to video difficulties. 04/11/23 symptom onset. Two home positive covid tests today. Patient with rare cough, post nasal drip, nasal drainage, sore throat, chills. Maximum temperature of 100.3. Denies shortness of breath nor wheezing. Is eating and drinking without difficulties, less appetite though is trying to do small meals and soup for nutrition. Is still doing light activity around the home. No known direct sick exposure, lives alone though went out with family for [employer], went to [place]. Patient has been taking coricidin for symptoms. Patient is vaccinated and boosted against Covid 19, is considering antiviral therapy. Summary of topics discussed:   Covid positive, mild symptoms. High risk patient with age and hypertension; start paxlovid, reviewed side effects and interactions, patient would like to proceed  Coricidin cold and cough, nasal fluticasone   Deep breathing exercises 10 times an hour while awake, prone positioning intermittently during the day reviewed  Fluids (pedialyte, broth, liquid iv, gatorade) for hydration  Light activity alternating with rest as able  Obtain pulse oximeter, monitor oxygen levels  ER precautions reviewed for worsening/severe symptoms, shortness of breath, or oxygen 90% or below; patient verbalized understanding and agrees. Call tomorrow afternoon with symptom update and     Continue with current treatment plan.       FRANCESCO Carranza

## 2023-04-13 NOTE — TELEPHONE ENCOUNTER
Future Appointments   Date Time Provider [facility]   4/13/2023  1:30 PM FRANCESCO Boswell [facility] Maddie  verbally consents to a Virtual/Telephone Check-In service on 4/13/2023. Patient understands and accepts financial responsibility for any deductible, co-insurance and/or co-pays associated with this service.

## 2023-04-14 ENCOUNTER — TELEPHONE (OUTPATIENT)
Dept: FAMILY MEDICINE CLINIC | Facility: CLINIC | Age: 88
End: 2023-04-14

## 2023-04-14 NOTE — TELEPHONE ENCOUNTER
Patient states she is feeling better since  Starting Paxlovid/Flonase. States cough is not as frequent. No fever. Was told to follow up today.

## 2023-06-26 ENCOUNTER — OFFICE VISIT (OUTPATIENT)
Dept: FAMILY MEDICINE CLINIC | Facility: CLINIC | Age: 88
End: 2023-06-26
Payer: MEDICARE

## 2023-06-26 VITALS
SYSTOLIC BLOOD PRESSURE: 138 MMHG | BODY MASS INDEX: 30.4 KG/M2 | DIASTOLIC BLOOD PRESSURE: 82 MMHG | OXYGEN SATURATION: 98 % | WEIGHT: 165.19 LBS | HEIGHT: 62 IN | TEMPERATURE: 98 F | RESPIRATION RATE: 16 BRPM | HEART RATE: 81 BPM

## 2023-06-26 DIAGNOSIS — B35.9 TINEA: ICD-10-CM

## 2023-06-26 DIAGNOSIS — K57.30 DIVERTICULOSIS OF COLON: ICD-10-CM

## 2023-06-26 DIAGNOSIS — M15.9 PRIMARY OSTEOARTHRITIS INVOLVING MULTIPLE JOINTS: ICD-10-CM

## 2023-06-26 DIAGNOSIS — M81.0 AGE-RELATED OSTEOPOROSIS WITHOUT CURRENT PATHOLOGICAL FRACTURE: ICD-10-CM

## 2023-06-26 DIAGNOSIS — I10 HTN (HYPERTENSION), BENIGN: ICD-10-CM

## 2023-06-26 DIAGNOSIS — H35.3132 INTERMEDIATE STAGE NONEXUDATIVE AGE-RELATED MACULAR DEGENERATION OF BOTH EYES: ICD-10-CM

## 2023-06-26 DIAGNOSIS — Z00.00 ENCOUNTER FOR ANNUAL HEALTH EXAMINATION: Primary | ICD-10-CM

## 2023-06-26 DIAGNOSIS — L20.82 FLEXURAL ECZEMA: ICD-10-CM

## 2023-06-26 DIAGNOSIS — Z90.49 HISTORY OF PARTIAL COLECTOMY: ICD-10-CM

## 2023-06-26 DIAGNOSIS — L30.1 DYSHIDROTIC ECZEMA: ICD-10-CM

## 2023-06-26 PROCEDURE — 1126F AMNT PAIN NOTED NONE PRSNT: CPT | Performed by: NURSE PRACTITIONER

## 2023-06-26 PROCEDURE — G0439 PPPS, SUBSEQ VISIT: HCPCS | Performed by: NURSE PRACTITIONER

## 2023-06-26 RX ORDER — CLOTRIMAZOLE 1 %
1 CREAM (GRAM) TOPICAL 2 TIMES DAILY
Qty: 45 G | Refills: 1 | Status: SHIPPED | OUTPATIENT
Start: 2023-06-26 | End: 2023-07-03

## 2023-07-05 ENCOUNTER — LAB ENCOUNTER (OUTPATIENT)
Dept: LAB | Age: 88
End: 2023-07-05
Attending: FAMILY MEDICINE
Payer: MEDICARE

## 2023-07-05 DIAGNOSIS — Z00.00 ENCOUNTER FOR ANNUAL HEALTH EXAMINATION: ICD-10-CM

## 2023-07-05 DIAGNOSIS — I10 HTN (HYPERTENSION), BENIGN: ICD-10-CM

## 2023-07-05 DIAGNOSIS — M81.0 AGE-RELATED OSTEOPOROSIS WITHOUT CURRENT PATHOLOGICAL FRACTURE: ICD-10-CM

## 2023-07-05 LAB
ALBUMIN SERPL-MCNC: 4 G/DL (ref 3.4–5)
ALBUMIN/GLOB SERPL: 1.1 {RATIO} (ref 1–2)
ALP LIVER SERPL-CCNC: 58 U/L
ALT SERPL-CCNC: 24 U/L
ANION GAP SERPL CALC-SCNC: 8 MMOL/L (ref 0–18)
AST SERPL-CCNC: 27 U/L (ref 15–37)
BASOPHILS # BLD AUTO: 0.04 X10(3) UL (ref 0–0.2)
BASOPHILS NFR BLD AUTO: 0.6 %
BILIRUB SERPL-MCNC: 0.8 MG/DL (ref 0.1–2)
BUN BLD-MCNC: 13 MG/DL (ref 7–18)
CALCIUM BLD-MCNC: 9.7 MG/DL (ref 8.5–10.1)
CHLORIDE SERPL-SCNC: 103 MMOL/L (ref 98–112)
CHOLEST SERPL-MCNC: 208 MG/DL (ref ?–200)
CO2 SERPL-SCNC: 27 MMOL/L (ref 21–32)
CREAT BLD-MCNC: 0.74 MG/DL
EOSINOPHIL # BLD AUTO: 0.09 X10(3) UL (ref 0–0.7)
EOSINOPHIL NFR BLD AUTO: 1.4 %
ERYTHROCYTE [DISTWIDTH] IN BLOOD BY AUTOMATED COUNT: 12.9 %
FASTING PATIENT LIPID ANSWER: NO
FASTING STATUS PATIENT QL REPORTED: NO
GFR SERPLBLD BASED ON 1.73 SQ M-ARVRAT: 77 ML/MIN/1.73M2 (ref 60–?)
GLOBULIN PLAS-MCNC: 3.6 G/DL (ref 2.8–4.4)
GLUCOSE BLD-MCNC: 89 MG/DL (ref 70–99)
HCT VFR BLD AUTO: 40.3 %
HDLC SERPL-MCNC: 72 MG/DL (ref 40–59)
HGB BLD-MCNC: 14 G/DL
IMM GRANULOCYTES # BLD AUTO: 0.01 X10(3) UL (ref 0–1)
IMM GRANULOCYTES NFR BLD: 0.2 %
LDLC SERPL CALC-MCNC: 108 MG/DL (ref ?–100)
LYMPHOCYTES # BLD AUTO: 2.18 X10(3) UL (ref 1–4)
LYMPHOCYTES NFR BLD AUTO: 33.1 %
MCH RBC QN AUTO: 32 PG (ref 26–34)
MCHC RBC AUTO-ENTMCNC: 34.7 G/DL (ref 31–37)
MCV RBC AUTO: 92.2 FL
MONOCYTES # BLD AUTO: 0.67 X10(3) UL (ref 0.1–1)
MONOCYTES NFR BLD AUTO: 10.2 %
NEUTROPHILS # BLD AUTO: 3.59 X10 (3) UL (ref 1.5–7.7)
NEUTROPHILS # BLD AUTO: 3.59 X10(3) UL (ref 1.5–7.7)
NEUTROPHILS NFR BLD AUTO: 54.5 %
NONHDLC SERPL-MCNC: 136 MG/DL (ref ?–130)
OSMOLALITY SERPL CALC.SUM OF ELEC: 286 MOSM/KG (ref 275–295)
PLATELET # BLD AUTO: 331 10(3)UL (ref 150–450)
POTASSIUM SERPL-SCNC: 4.4 MMOL/L (ref 3.5–5.1)
PROT SERPL-MCNC: 7.6 G/DL (ref 6.4–8.2)
RBC # BLD AUTO: 4.37 X10(6)UL
SODIUM SERPL-SCNC: 138 MMOL/L (ref 136–145)
T4 FREE SERPL-MCNC: 1 NG/DL (ref 0.8–1.7)
TRIGL SERPL-MCNC: 166 MG/DL (ref 30–149)
TSI SER-ACNC: 1.48 MIU/ML (ref 0.36–3.74)
VIT D+METAB SERPL-MCNC: 18.7 NG/ML (ref 30–100)
VLDLC SERPL CALC-MCNC: 28 MG/DL (ref 0–30)
WBC # BLD AUTO: 6.6 X10(3) UL (ref 4–11)

## 2023-07-05 PROCEDURE — 85025 COMPLETE CBC W/AUTO DIFF WBC: CPT

## 2023-07-05 PROCEDURE — 82306 VITAMIN D 25 HYDROXY: CPT

## 2023-07-05 PROCEDURE — 80053 COMPREHEN METABOLIC PANEL: CPT

## 2023-07-05 PROCEDURE — 84439 ASSAY OF FREE THYROXINE: CPT

## 2023-07-05 PROCEDURE — 84443 ASSAY THYROID STIM HORMONE: CPT

## 2023-07-05 PROCEDURE — 80061 LIPID PANEL: CPT

## 2023-07-05 PROCEDURE — 36415 COLL VENOUS BLD VENIPUNCTURE: CPT

## 2023-07-06 ENCOUNTER — TELEPHONE (OUTPATIENT)
Dept: FAMILY MEDICINE CLINIC | Facility: CLINIC | Age: 88
End: 2023-07-06

## 2023-07-06 DIAGNOSIS — E55.9 VITAMIN D DEFICIENCY: Primary | ICD-10-CM

## 2023-07-06 RX ORDER — ERGOCALCIFEROL 1.25 MG/1
50000 CAPSULE ORAL WEEKLY
Qty: 12 CAPSULE | Refills: 0 | Status: SHIPPED | OUTPATIENT
Start: 2023-07-06 | End: 2023-10-04

## 2023-07-06 NOTE — TELEPHONE ENCOUNTER
Please inform patient that her vitamin D level is low at 18.7. Recommend to start vitamin D 50,000 units once a week for 12 weeks. Total cholesterol and LDL cholesterol remains slightly elevated at 208 and 108, triglycerides has gone up to 166. Recommend low-cholesterol diet and exercise as tolerated.   Rest of labs are normal.    I have sent Rx to pharmacy

## 2023-07-12 RX ORDER — LISINOPRIL 10 MG/1
10 TABLET ORAL DAILY
Qty: 90 TABLET | Refills: 1 | Status: SHIPPED | OUTPATIENT
Start: 2023-07-12

## 2023-07-12 NOTE — TELEPHONE ENCOUNTER
Last Refill: 6/20/2022 #90 with 3 refills  Last Px: 6/26/23    Return in 6 months (on 12/26/2023). Future appt: Your appointments       Date & Time Appointment Department [facility])    Nov 03, 2023 11:15 AM CDT Follow up - Extended with Brian Stevens MD [address], [place])              [address], [facility]  [facility] [address] [phone]  [phone]          Last Appointment with provider:   Visit date not found  Last appointment at [facility]:  6/26/2023  Cholesterol, Total (mg/dL)   Date Value   07/05/2023 208 (H)     HDL Cholesterol (mg/dL)   Date Value   07/05/2023 72 (H)     LDL Cholesterol (mg/dL)   Date Value   07/05/2023 108 (H)     Triglycerides (mg/dL)   Date Value   07/05/2023 166 (H)     No results found for: EAG, A1C  Lab Results   Component Value Date    T4F 1.0 07/05/2023    TSH 1.480 07/05/2023       No follow-ups on file.

## 2023-09-07 ENCOUNTER — TELEPHONE (OUTPATIENT)
Dept: FAMILY MEDICINE CLINIC | Facility: CLINIC | Age: 88
End: 2023-09-07

## 2023-09-07 NOTE — TELEPHONE ENCOUNTER
Patient is having a pain in her left side.   Can you please give her a call back  Future Appointments   Date Time Provider [facility]   11/3/2023 11:15 AM Carmella Vargas MD [facility]

## 2023-09-07 NOTE — TELEPHONE ENCOUNTER
Pt c/o left sided pain that has been going on since Sunday. Pt feels it is a flare of diverticulitis as she has had them in the past but last time was about 4 years ago. Pt denies rectal bleeding, fever, blood stools, and malaise. Pt expressed feeling well overall. Pt did have 3 bowel movements today and had to strain a little with them. Please advises.

## 2023-09-07 NOTE — TELEPHONE ENCOUNTER
Pt notified, scheduled visit with EL 9/9/23. Advised if pain severe before appt to go to ED. Pt understands and is agreeable.

## 2023-09-09 ENCOUNTER — LAB ENCOUNTER (OUTPATIENT)
Dept: LAB | Age: 88
End: 2023-09-09
Attending: NURSE PRACTITIONER
Payer: MEDICARE

## 2023-09-09 ENCOUNTER — OFFICE VISIT (OUTPATIENT)
Dept: FAMILY MEDICINE CLINIC | Facility: CLINIC | Age: 88
End: 2023-09-09
Payer: MEDICARE

## 2023-09-09 VITALS
OXYGEN SATURATION: 97 % | HEIGHT: 62 IN | WEIGHT: 162.81 LBS | DIASTOLIC BLOOD PRESSURE: 78 MMHG | TEMPERATURE: 98 F | RESPIRATION RATE: 16 BRPM | HEART RATE: 85 BPM | BODY MASS INDEX: 29.96 KG/M2 | SYSTOLIC BLOOD PRESSURE: 138 MMHG

## 2023-09-09 DIAGNOSIS — R31.1 BENIGN ESSENTIAL MICROSCOPIC HEMATURIA: ICD-10-CM

## 2023-09-09 DIAGNOSIS — I10 HTN (HYPERTENSION), BENIGN: ICD-10-CM

## 2023-09-09 DIAGNOSIS — R10.30 LOWER ABDOMINAL PAIN: Primary | ICD-10-CM

## 2023-09-09 DIAGNOSIS — R10.30 LOWER ABDOMINAL PAIN: ICD-10-CM

## 2023-09-09 LAB
ALBUMIN SERPL-MCNC: 3.8 G/DL (ref 3.4–5)
ALBUMIN/GLOB SERPL: 1 {RATIO} (ref 1–2)
ALP LIVER SERPL-CCNC: 60 U/L
ALT SERPL-CCNC: 23 U/L
ANION GAP SERPL CALC-SCNC: 7 MMOL/L (ref 0–18)
APPEARANCE: CLEAR
AST SERPL-CCNC: 22 U/L (ref 15–37)
BASOPHILS # BLD AUTO: 0.05 X10(3) UL (ref 0–0.2)
BASOPHILS NFR BLD AUTO: 0.7 %
BILIRUB SERPL-MCNC: 0.7 MG/DL (ref 0.1–2)
BILIRUB UR QL STRIP.AUTO: NEGATIVE
BILIRUBIN: NEGATIVE
BUN BLD-MCNC: 13 MG/DL (ref 7–18)
CALCIUM BLD-MCNC: 9.2 MG/DL (ref 8.5–10.1)
CHLORIDE SERPL-SCNC: 106 MMOL/L (ref 98–112)
CLARITY UR REFRACT.AUTO: CLEAR
CO2 SERPL-SCNC: 27 MMOL/L (ref 21–32)
COLOR UR AUTO: YELLOW
CREAT BLD-MCNC: 0.75 MG/DL
EGFRCR SERPLBLD CKD-EPI 2021: 76 ML/MIN/1.73M2 (ref 60–?)
EOSINOPHIL # BLD AUTO: 0.1 X10(3) UL (ref 0–0.7)
EOSINOPHIL NFR BLD AUTO: 1.4 %
ERYTHROCYTE [DISTWIDTH] IN BLOOD BY AUTOMATED COUNT: 12.9 %
FASTING STATUS PATIENT QL REPORTED: NO
GLOBULIN PLAS-MCNC: 3.8 G/DL (ref 2.8–4.4)
GLUCOSE (URINE DIPSTICK): NEGATIVE MG/DL
GLUCOSE BLD-MCNC: 102 MG/DL (ref 70–99)
GLUCOSE UR STRIP.AUTO-MCNC: NORMAL MG/DL
HCT VFR BLD AUTO: 40.1 %
HGB BLD-MCNC: 13.4 G/DL
IMM GRANULOCYTES # BLD AUTO: 0.02 X10(3) UL (ref 0–1)
IMM GRANULOCYTES NFR BLD: 0.3 %
KETONES (URINE DIPSTICK): NEGATIVE MG/DL
KETONES UR STRIP.AUTO-MCNC: NEGATIVE MG/DL
LEUKOCYTE ESTERASE UR QL STRIP.AUTO: NEGATIVE
LEUKOCYTES: NEGATIVE
LYMPHOCYTES # BLD AUTO: 2.06 X10(3) UL (ref 1–4)
LYMPHOCYTES NFR BLD AUTO: 29.4 %
MCH RBC QN AUTO: 31.4 PG (ref 26–34)
MCHC RBC AUTO-ENTMCNC: 33.4 G/DL (ref 31–37)
MCV RBC AUTO: 93.9 FL
MONOCYTES # BLD AUTO: 0.64 X10(3) UL (ref 0.1–1)
MONOCYTES NFR BLD AUTO: 9.1 %
MULTISTIX LOT#: ABNORMAL NUMERIC
NEUTROPHILS # BLD AUTO: 4.14 X10 (3) UL (ref 1.5–7.7)
NEUTROPHILS # BLD AUTO: 4.14 X10(3) UL (ref 1.5–7.7)
NEUTROPHILS NFR BLD AUTO: 59.1 %
NITRITE UR QL STRIP.AUTO: NEGATIVE
NITRITE, URINE: NEGATIVE
OSMOLALITY SERPL CALC.SUM OF ELEC: 290 MOSM/KG (ref 275–295)
PH UR STRIP.AUTO: 5.5 [PH] (ref 5–8)
PH, URINE: 6 (ref 4.5–8)
PLATELET # BLD AUTO: 342 10(3)UL (ref 150–450)
POTASSIUM SERPL-SCNC: 4 MMOL/L (ref 3.5–5.1)
PROT SERPL-MCNC: 7.6 G/DL (ref 6.4–8.2)
PROT UR STRIP.AUTO-MCNC: NEGATIVE MG/DL
PROTEIN (URINE DIPSTICK): NEGATIVE MG/DL
RBC # BLD AUTO: 4.27 X10(6)UL
RBC UR QL AUTO: NEGATIVE
SODIUM SERPL-SCNC: 140 MMOL/L (ref 136–145)
SP GR UR STRIP.AUTO: 1.01 (ref 1–1.03)
SPECIFIC GRAVITY: 1.02 (ref 1–1.03)
URINE-COLOR: YELLOW
UROBILINOGEN UR STRIP.AUTO-MCNC: NORMAL MG/DL
UROBILINOGEN,SEMI-QN: 0.2 MG/DL (ref 0–1.9)
WBC # BLD AUTO: 7 X10(3) UL (ref 4–11)

## 2023-09-09 PROCEDURE — 36415 COLL VENOUS BLD VENIPUNCTURE: CPT

## 2023-09-09 PROCEDURE — 80053 COMPREHEN METABOLIC PANEL: CPT

## 2023-09-09 PROCEDURE — 99214 OFFICE O/P EST MOD 30 MIN: CPT | Performed by: NURSE PRACTITIONER

## 2023-09-09 PROCEDURE — 85025 COMPLETE CBC W/AUTO DIFF WBC: CPT

## 2023-09-09 PROCEDURE — 81003 URINALYSIS AUTO W/O SCOPE: CPT | Performed by: NURSE PRACTITIONER

## 2023-09-09 NOTE — PATIENT INSTRUCTIONS
Blood work today       Call [facility] on Monday [phone]  to schedule a CT scan of your abdomen and pelvis       Clear liquid diet, avoid dairy and acidic, spicy, and fatty foods. When ready for food try BRAT diet (bananas, rice, applesauce, tea/toast) and crackers. Follow up if symptoms persist or increase (fever, blood in your stool, dark black/sticky stools, or severe abdominal pain).       If severe pain over the weekend- go to the ER

## 2023-09-13 ENCOUNTER — TELEPHONE (OUTPATIENT)
Dept: FAMILY MEDICINE CLINIC | Facility: CLINIC | Age: 88
End: 2023-09-13

## (undated) NOTE — MR AVS SNAPSHOT
[address]  [place] [phone] [phone]               Thank you for choosing us for your health care visit with Kimberley Lucas MD.  We are glad to serve you and happy to provide you with this summary o Commonly known as:  VITAMIN B12           vitamin C 1000 MG Tabs   Take 1,000 mg by mouth daily.                 Where to Get Your Medications      These medications were sent to [facility], [address] [place] [address] [facility]

## (undated) NOTE — MR AVS SNAPSHOT
[address]  [place] [address] [phone]  [phone]               Thank you for choosing us for your health care visit with Corby Mackenzie MD.  We are glad to serve you and happy to provide you with this summary o Current Medications          This list is accurate as of: 5/23/17 10:33 AM.  Always use your most recent med list.                ANUSOL-HC 2.5 % Crea   Generic drug:  hydrocortisone   Apply 1 Application topically 2 (two) times daily as needed.

## (undated) NOTE — MR AVS SNAPSHOT
[address]  [address] [phone]               Thank you for choosing us for your health care visit with Alvarado Khan MD.  We are glad to serve you and happy to provide you with this summary o Apply 1 Application topically 2 (two) times daily as needed. Commonly known as:  LIDEX           MULTIVITAMINS Caps   Take 1 capsule by mouth daily. Vitamin B-12 1000 MCG Tabs   Take 1,000 mcg by mouth daily.    Commonly known as:  VITAMIN B12 EAT THESE FOODS MORE OFTEN: EAT THESE FOODS LESS OFTEN:   Make half your plate fruits and vegetables Highly refined, white starches including white bread, rice and pasta   Eat plenty of protein, keep the fat content low Sugars:  sodas and sports drinks, ca